# Patient Record
Sex: FEMALE | Race: WHITE | NOT HISPANIC OR LATINO | Employment: FULL TIME | URBAN - METROPOLITAN AREA
[De-identification: names, ages, dates, MRNs, and addresses within clinical notes are randomized per-mention and may not be internally consistent; named-entity substitution may affect disease eponyms.]

---

## 2023-05-23 LAB — CHORIOGONADOTROPIN (MIU/ML) IN SER/PLAS: 8536 IU/L

## 2023-05-25 LAB
CHORIOGONADOTROPIN (MIU/ML) IN SER/PLAS: 1797 IU/L
ERYTHROCYTE DISTRIBUTION WIDTH (RATIO) BY AUTOMATED COUNT: 12.8 % (ref 11.5–14.5)
ERYTHROCYTE MEAN CORPUSCULAR HEMOGLOBIN CONCENTRATION (G/DL) BY AUTOMATED: 32.9 G/DL (ref 32–36)
ERYTHROCYTE MEAN CORPUSCULAR VOLUME (FL) BY AUTOMATED COUNT: 91 FL (ref 80–100)
ERYTHROCYTES (10*6/UL) IN BLOOD BY AUTOMATED COUNT: 3.66 X10E12/L (ref 4–5.2)
HEMATOCRIT (%) IN BLOOD BY AUTOMATED COUNT: 33.4 % (ref 36–46)
HEMOGLOBIN (G/DL) IN BLOOD: 11 G/DL (ref 12–16)
LEUKOCYTES (10*3/UL) IN BLOOD BY AUTOMATED COUNT: 9.4 X10E9/L (ref 4.4–11.3)
NRBC (PER 100 WBCS) BY AUTOMATED COUNT: 0 /100 WBC (ref 0–0)
PLATELETS (10*3/UL) IN BLOOD AUTOMATED COUNT: 292 X10E9/L (ref 150–450)
REFLEX ADDED, ANEMIA PANEL: ABNORMAL

## 2023-10-15 PROBLEM — S99.911A RIGHT ANKLE INJURY, INITIAL ENCOUNTER: Status: ACTIVE | Noted: 2023-10-15

## 2023-10-15 PROBLEM — D22.5 MELANOCYTIC NEVI OF TRUNK: Status: ACTIVE | Noted: 2020-09-03

## 2023-10-15 PROBLEM — K63.89 MASS OF COLON: Status: ACTIVE | Noted: 2023-10-15

## 2023-10-15 PROBLEM — B96.89 BACTERIAL VAGINOSIS: Status: ACTIVE | Noted: 2023-10-15

## 2023-10-15 PROBLEM — D22.60 MELANOCYTIC NEVI OF UNSPECIFIED UPPER LIMB, INCLUDING SHOULDER: Status: ACTIVE | Noted: 2020-09-03

## 2023-10-15 PROBLEM — R07.9 CHEST PAIN: Status: ACTIVE | Noted: 2023-10-15

## 2023-10-15 PROBLEM — O03.4 INCOMPLETE ABORTION (HHS-HCC): Status: ACTIVE | Noted: 2023-10-15

## 2023-10-15 PROBLEM — N89.8 VAGINAL ITCHING: Status: ACTIVE | Noted: 2023-10-15

## 2023-10-15 PROBLEM — R79.89 ABNORMAL THYROID BLOOD TEST: Status: ACTIVE | Noted: 2023-10-15

## 2023-10-15 PROBLEM — D48.5 NEOPLASM OF UNCERTAIN BEHAVIOR OF SKIN: Status: ACTIVE | Noted: 2020-09-03

## 2023-10-15 PROBLEM — N89.8 VAGINAL ODOR: Status: ACTIVE | Noted: 2023-10-15

## 2023-10-15 PROBLEM — B35.1 TINEA UNGUIUM: Status: ACTIVE | Noted: 2020-09-03

## 2023-10-15 PROBLEM — N92.6 IRREGULAR BLEEDING: Status: ACTIVE | Noted: 2023-10-15

## 2023-10-15 PROBLEM — K64.9 HEMORRHOIDS: Status: ACTIVE | Noted: 2023-10-15

## 2023-10-15 PROBLEM — D18.01 HEMANGIOMA OF SKIN AND SUBCUTANEOUS TISSUE: Status: ACTIVE | Noted: 2020-09-03

## 2023-10-15 PROBLEM — L82.1 OTHER SEBORRHEIC KERATOSIS: Status: ACTIVE | Noted: 2020-09-03

## 2023-10-15 PROBLEM — D22.20 MELANOCYTIC NEVI OF UNSPECIFIED EAR AND EXTERNAL AURICULAR CANAL: Status: ACTIVE | Noted: 2020-09-03

## 2023-10-15 PROBLEM — D22.70 MELANOCYTIC NEVI OF UNSPECIFIED LOWER LIMB, INCLUDING HIP: Status: ACTIVE | Noted: 2020-09-03

## 2023-10-15 PROBLEM — N76.0 BACTERIAL VAGINOSIS: Status: ACTIVE | Noted: 2023-10-15

## 2023-10-15 PROBLEM — E04.2 MULTINODULAR NON-TOXIC GOITER: Status: ACTIVE | Noted: 2023-10-15

## 2023-10-15 PROBLEM — R22.31 FINGER MASS, RIGHT: Status: ACTIVE | Noted: 2023-10-15

## 2023-10-15 PROBLEM — O20.0 THREATENED ABORTION (HHS-HCC): Status: ACTIVE | Noted: 2023-10-15

## 2023-10-15 PROBLEM — D22.30 MELANOCYTIC NEVI OF UNSPECIFIED PART OF FACE: Status: ACTIVE | Noted: 2020-09-03

## 2023-10-15 PROBLEM — E05.90 SUBCLINICAL HYPERTHYROIDISM: Status: ACTIVE | Noted: 2023-10-15

## 2023-10-15 PROBLEM — I31.39 PERICARDIAL EFFUSION (HHS-HCC): Status: ACTIVE | Noted: 2023-10-15

## 2023-10-15 PROBLEM — D22.4 MELANOCYTIC NEVI OF SCALP AND NECK: Status: ACTIVE | Noted: 2020-09-03

## 2023-10-15 RX ORDER — HYDROCORTISONE ACETATE PRAMOXINE HCL 2.5; 1 G/100G; G/100G
CREAM TOPICAL 4 TIMES DAILY
COMMUNITY
Start: 2022-08-16 | End: 2023-11-07 | Stop reason: WASHOUT

## 2023-10-15 RX ORDER — MUPIROCIN 20 MG/G
1 OINTMENT TOPICAL
COMMUNITY
Start: 2019-03-15 | End: 2023-11-07 | Stop reason: WASHOUT

## 2023-10-15 RX ORDER — IBUPROFEN 600 MG/1
1 TABLET ORAL EVERY 6 HOURS PRN
COMMUNITY
Start: 2020-11-13 | End: 2024-04-04 | Stop reason: WASHOUT

## 2023-10-15 RX ORDER — CICLOPIROX 80 MG/ML
1 SOLUTION TOPICAL
COMMUNITY
Start: 2019-03-12 | End: 2023-11-07 | Stop reason: WASHOUT

## 2023-10-15 RX ORDER — CICLOPIROX OLAMINE 7.7 MG/G
CREAM TOPICAL
COMMUNITY
Start: 2022-08-27 | End: 2023-11-07 | Stop reason: WASHOUT

## 2023-10-15 RX ORDER — OSELTAMIVIR PHOSPHATE 75 MG/1
1 CAPSULE ORAL 2 TIMES DAILY
COMMUNITY
Start: 2022-03-16 | End: 2023-11-07 | Stop reason: WASHOUT

## 2023-10-15 RX ORDER — DOCUSATE SODIUM 100 MG/1
1 CAPSULE, LIQUID FILLED ORAL 2 TIMES DAILY PRN
COMMUNITY
Start: 2020-11-13 | End: 2023-11-07 | Stop reason: WASHOUT

## 2023-10-15 RX ORDER — ACETAMINOPHEN 325 MG/1
3 TABLET ORAL EVERY 6 HOURS
COMMUNITY
Start: 2020-11-13 | End: 2023-11-07 | Stop reason: WASHOUT

## 2023-10-15 RX ORDER — PRENATAL VIT,CAL 76/IRON/FOLIC 29 MG-1 MG
1 TABLET ORAL DAILY
COMMUNITY
End: 2023-11-07 | Stop reason: WASHOUT

## 2023-10-15 RX ORDER — PRENATAL 71/IRON/FOLIC AC/DHA 30-1.4-2
1 CAPSULE,IMMEDIATE, DELAY RELEASE,BIPHASE ORAL DAILY
COMMUNITY
Start: 2018-05-23 | End: 2023-11-07 | Stop reason: WASHOUT

## 2023-10-17 ENCOUNTER — APPOINTMENT (OUTPATIENT)
Dept: OBSTETRICS AND GYNECOLOGY | Facility: CLINIC | Age: 38
End: 2023-10-17
Payer: COMMERCIAL

## 2023-11-07 ENCOUNTER — TELEMEDICINE (OUTPATIENT)
Dept: PRIMARY CARE | Facility: CLINIC | Age: 38
End: 2023-11-07
Payer: COMMERCIAL

## 2023-11-07 DIAGNOSIS — J01.90 ACUTE NON-RECURRENT SINUSITIS, UNSPECIFIED LOCATION: Primary | ICD-10-CM

## 2023-11-07 PROCEDURE — 99213 OFFICE O/P EST LOW 20 MIN: CPT | Performed by: FAMILY MEDICINE

## 2023-11-07 RX ORDER — AMOXICILLIN AND CLAVULANATE POTASSIUM 875; 125 MG/1; MG/1
875 TABLET, FILM COATED ORAL 2 TIMES DAILY
Qty: 14 TABLET | Refills: 0 | Status: SHIPPED | OUTPATIENT
Start: 2023-11-07 | End: 2023-11-14

## 2023-11-07 ASSESSMENT — LIFESTYLE VARIABLES: HISTORY_OF_SMOKING: I SMOKED IN THE PAST, BUT NOT REGULARLY

## 2023-11-07 ASSESSMENT — ENCOUNTER SYMPTOMS
RHINORRHEA: 1
SINUS PRESSURE: 1
FEVER: 0
SINUS PAIN: 1
COUGH: 0
SHORTNESS OF BREATH: 0
FATIGUE: 0

## 2023-11-07 NOTE — PROGRESS NOTES
Subjective   Patient ID: Amparo Perez is a 38 y.o. female who presents for No chief complaint on file..    Virtual or Telephone Consent    An interactive audio and video telecommunication system which permits real time communications between the patient (at the originating site) and provider (at the distant site) was utilized to provide this telehealth service.   Verbal consent was requested and obtained from Amparo Perez on this date, 11/07/23 for a telehealth visit.   Pt presents with sore throat, nasal congestion and now laryngitis  No fever or chills    She has taken Mucinex, Flonase, Ibuprofen               Review of Systems   Constitutional:  Negative for fatigue and fever.   HENT:  Positive for rhinorrhea, sinus pressure and sinus pain.    Respiratory:  Negative for cough and shortness of breath.        Objective   There were no vitals taken for this visit.    Physical Exam  Constitutional:       Appearance: Normal appearance.      Comments: Voice hoarseness noted   HENT:      Nose: Congestion present.   Pulmonary:      Effort: Pulmonary effort is normal.   Neurological:      Mental Status: She is alert.         Virtual Visit Duration: 10 min  Pt verified with Photo ID in Media  Pt is not currently pregnant    Assessment/Plan   Diagnoses and all orders for this visit:  Acute non-recurrent sinusitis, unspecified location  -     amoxicillin-pot clavulanate (Augmentin) 875-125 mg tablet; Take 1 tablet (875 mg) by mouth 2 times a day for 7 days.  I counseled patient regarding the risks, benefits and side effects of antibiotics.     Recc continued supportive care, fluids, rest, Mucinex w/ decongestant, cool mist humidifier  I advised the patient to have an in person exam with PCP, urgent care, or Emergency Room with any exacerbation of symptoms. The patient understands and agrees.     Karolina Padron, DO

## 2023-11-27 ENCOUNTER — OFFICE VISIT (OUTPATIENT)
Dept: OBSTETRICS AND GYNECOLOGY | Facility: CLINIC | Age: 38
End: 2023-11-27
Payer: COMMERCIAL

## 2023-11-27 VITALS
HEIGHT: 68 IN | BODY MASS INDEX: 28.04 KG/M2 | DIASTOLIC BLOOD PRESSURE: 69 MMHG | SYSTOLIC BLOOD PRESSURE: 121 MMHG | WEIGHT: 185 LBS

## 2023-11-27 DIAGNOSIS — Z01.419 ENCOUNTER FOR GYNECOLOGICAL EXAMINATION WITHOUT ABNORMAL FINDING: ICD-10-CM

## 2023-11-27 DIAGNOSIS — Z30.41 ENCOUNTER FOR SURVEILLANCE OF CONTRACEPTIVE PILLS: Primary | ICD-10-CM

## 2023-11-27 PROCEDURE — 99395 PREV VISIT EST AGE 18-39: CPT | Performed by: OBSTETRICS & GYNECOLOGY

## 2023-11-27 PROCEDURE — 1036F TOBACCO NON-USER: CPT | Performed by: OBSTETRICS & GYNECOLOGY

## 2023-11-27 RX ORDER — LEVONORGESTREL AND ETHINYL ESTRADIOL 0.15-0.03
1 KIT ORAL DAILY
Qty: 90 TABLET | Refills: 3 | Status: SHIPPED | OUTPATIENT
Start: 2023-11-27 | End: 2024-03-19 | Stop reason: ALTCHOICE

## 2023-11-27 NOTE — PROGRESS NOTES
Subjective   Amparo Perez is a 38 y.o. female  here for a routine exam. Current complaints: She has regular cycles but they are getting heavier.  She is considering resuming birth control pills.  She has found the pill that she was on most recently, Levora, was the most tolerable.    Her daughter is now 3 years old.  There is no dysuria, no change in bowel habits or vaginal discharge.  She is a small bump on the left vulva.. Personal health questionnaire reviewed: yes.     Gynecologic History  Patient's last menstrual period was 2023 (exact date).  Contraception: none  Last Pap: 21. Results were: normal  Last mammogram: n/a. Results were:  n/a    Obstetric History  OB History   No obstetric history on file.       Objective   Constitutional: Alert and in no acute distress. Well developed, well nourished.   Head and Face: Head and face: Normal.    Eyes: Normal external exam - nonicteric sclera, extraocular movements intact (EOMI) and no ptosis.   Neck: No neck asymmetry. Supple. Thyroid not enlarged and there were no palpable thyroid nodules.    Pulmonary: No respiratory distress.   Chest: Breasts: Normal appearance, no nipple discharge and no skin changes. Palpation of breasts and axillae: No palpable mass and no axillary lymphadenopathy.   Abdomen: Soft nontender; no abdominal mass palpated. No organomegaly. No hernias.   Genitourinary: External genitalia: Normal. No inguinal lymphadenopathy. Bartholin's Urethral and Skenes Glands: Normal. Urethra: Normal.  Bladder: Normal on palpation. Vagina: Normal. Cervix: Normal.  Uterus: Normal.  Right Adnexa/parametria: Normal.  Left Adnexa/parametria: Normal.  Inspection of Perianal Area: Normal.   Musculoskeletal: No joint swelling seen, normal movements of all extremities.   Skin: Normal skin color and pigmentation, normal skin turgor, and no rash.   Neurologic: Non-focal. Grossly intact.   Psychiatric: Alert and oriented x 3. Affect normal to  patient baseline. Mood: Appropriate.  Physical Exam     Assessment/Plan   Healthy female exam.  This is a 38-year-old female with a normal exam.  No Pap was sent, she is high risk HPV negative.  We discussed resuming her birth control pill most recent was Levora.  I recommend starting on the Sunday after her period starts.  It may take 2 weeks for it to be effective for contraception in about 3 months to adjust to the pill.  I will see her routinely in 1 year.  Contraception: OCP (estrogen/progesterone).

## 2023-12-19 ENCOUNTER — APPOINTMENT (OUTPATIENT)
Dept: OBSTETRICS AND GYNECOLOGY | Facility: CLINIC | Age: 38
End: 2023-12-19
Payer: COMMERCIAL

## 2023-12-19 ENCOUNTER — TELEMEDICINE (OUTPATIENT)
Dept: PRIMARY CARE | Facility: CLINIC | Age: 38
End: 2023-12-19
Payer: COMMERCIAL

## 2023-12-19 DIAGNOSIS — J01.01 ACUTE RECURRENT MAXILLARY SINUSITIS: Primary | ICD-10-CM

## 2023-12-19 PROCEDURE — 99213 OFFICE O/P EST LOW 20 MIN: CPT | Performed by: FAMILY MEDICINE

## 2023-12-19 RX ORDER — AMOXICILLIN AND CLAVULANATE POTASSIUM 875; 125 MG/1; MG/1
875 TABLET, FILM COATED ORAL 2 TIMES DAILY
Qty: 20 TABLET | Refills: 0 | Status: SHIPPED | OUTPATIENT
Start: 2023-12-19 | End: 2023-12-29

## 2023-12-19 ASSESSMENT — ENCOUNTER SYMPTOMS
SINUS PRESSURE: 1
SINUS PAIN: 1
SHORTNESS OF BREATH: 0
STRIDOR: 0
WHEEZING: 0
COUGH: 1
FATIGUE: 0
FEVER: 0

## 2023-12-19 NOTE — PROGRESS NOTES
Subjective   Patient ID: Amparo Perez is a 38 y.o. female who presents for No chief complaint on file..    Virtual or Telephone Consent    An interactive audio and video telecommunication system which permits real time communications between the patient (at the originating site) and provider (at the distant site) was utilized to provide this telehealth service.   Verbal consent was requested and obtained from Amparo Perez on this date, 12/19/23 for a telehealth visit.       Pt presents with cough, cold, congestion x 10 days  No fever, no chills  OTC meds: Cold and sinus med, as well as Flonase  She has maxillary sinus pressure as well  No SOB    She reports she did have a sinus infection about 2 months ago and Augmentin did work for her         Review of Systems   Constitutional:  Negative for fatigue and fever.   HENT:  Positive for congestion, sinus pressure and sinus pain.    Respiratory:  Positive for cough. Negative for shortness of breath, wheezing and stridor.        Objective   LMP 11/19/2023 (Exact Date)     Physical Exam  Constitutional:       Appearance: Normal appearance.   HENT:      Nose: Congestion present.   Pulmonary:      Effort: Pulmonary effort is normal.   Neurological:      Mental Status: She is alert.       Virtual Visit Duration: 6 min  Meds reviewed  Med allergies: NKDA    Assessment/Plan   Diagnoses and all orders for this visit:  Acute recurrent maxillary sinusitis  -     amoxicillin-pot clavulanate (Augmentin) 875-125 mg tablet; Take 1 tablet (875 mg) by mouth 2 times a day for 10 days.    I counseled patient regarding the risks, benefits and side effects of antibiotics.  Recc continued supportive care, fluids, rest OTC cough and cold meds    I advised the patient to have an in person exam with PCP, urgent care, or Emergency Room with any exacerbation of symptoms. The patient understands and agrees.   Karolina Padron, DO

## 2024-01-03 ENCOUNTER — LAB (OUTPATIENT)
Dept: LAB | Facility: LAB | Age: 39
End: 2024-01-03
Payer: COMMERCIAL

## 2024-01-03 ENCOUNTER — TELEPHONE (OUTPATIENT)
Dept: ENDOCRINOLOGY | Facility: CLINIC | Age: 39
End: 2024-01-03
Payer: COMMERCIAL

## 2024-01-03 DIAGNOSIS — E05.90 SUBCLINICAL HYPERTHYROIDISM: Primary | ICD-10-CM

## 2024-01-03 DIAGNOSIS — E05.90 SUBCLINICAL HYPERTHYROIDISM: ICD-10-CM

## 2024-01-03 DIAGNOSIS — E04.2 MULTINODULAR GOITER: ICD-10-CM

## 2024-01-03 LAB
T4 FREE SERPL-MCNC: 0.83 NG/DL (ref 0.61–1.12)
TSH SERPL-ACNC: 0.49 MIU/L (ref 0.44–3.98)

## 2024-01-03 PROCEDURE — 84443 ASSAY THYROID STIM HORMONE: CPT

## 2024-01-03 PROCEDURE — 84439 ASSAY OF FREE THYROXINE: CPT

## 2024-01-03 PROCEDURE — 36415 COLL VENOUS BLD VENIPUNCTURE: CPT

## 2024-01-03 PROCEDURE — 84481 FREE ASSAY (FT-3): CPT

## 2024-01-04 LAB — T3FREE SERPL-MCNC: 3.1 PG/ML (ref 2.3–4.2)

## 2024-01-08 ENCOUNTER — HOSPITAL ENCOUNTER (OUTPATIENT)
Dept: RADIOLOGY | Facility: HOSPITAL | Age: 39
Discharge: HOME | End: 2024-01-08
Payer: COMMERCIAL

## 2024-01-08 DIAGNOSIS — E04.2 MULTINODULAR GOITER: ICD-10-CM

## 2024-01-08 PROCEDURE — 76536 US EXAM OF HEAD AND NECK: CPT

## 2024-01-08 PROCEDURE — 76536 US EXAM OF HEAD AND NECK: CPT | Performed by: RADIOLOGY

## 2024-03-19 ENCOUNTER — OFFICE VISIT (OUTPATIENT)
Dept: ENDOCRINOLOGY | Facility: CLINIC | Age: 39
End: 2024-03-19
Payer: COMMERCIAL

## 2024-03-19 VITALS
DIASTOLIC BLOOD PRESSURE: 76 MMHG | HEART RATE: 66 BPM | RESPIRATION RATE: 16 BRPM | SYSTOLIC BLOOD PRESSURE: 130 MMHG | HEIGHT: 68 IN | BODY MASS INDEX: 28.61 KG/M2 | WEIGHT: 188.8 LBS

## 2024-03-19 DIAGNOSIS — E04.2 MULTINODULAR GOITER: Primary | ICD-10-CM

## 2024-03-19 PROCEDURE — 1036F TOBACCO NON-USER: CPT | Performed by: INTERNAL MEDICINE

## 2024-03-19 PROCEDURE — 99204 OFFICE O/P NEW MOD 45 MIN: CPT | Performed by: INTERNAL MEDICINE

## 2024-03-19 RX ORDER — SULFACETAMIDE SODIUM, SULFUR 100; 50 MG/G; MG/G
EMULSION TOPICAL
COMMUNITY
Start: 2024-01-29 | End: 2024-05-13 | Stop reason: ALTCHOICE

## 2024-03-19 RX ORDER — TAZAROTENE 0.45 MG/G
LOTION TOPICAL DAILY
COMMUNITY
End: 2024-04-04 | Stop reason: WASHOUT

## 2024-03-19 ASSESSMENT — ENCOUNTER SYMPTOMS
COUGH: 0
FATIGUE: 0
DIARRHEA: 0
FEVER: 0
HEADACHES: 0
VOMITING: 0
PALPITATIONS: 0
NAUSEA: 0
SHORTNESS OF BREATH: 0
CHILLS: 0

## 2024-03-19 NOTE — PROGRESS NOTES
Endocrinology New Patient Consult  Subjective   Patient ID: Amparo Perez is a 39 y.o. female who presents for Goiter.    PCP: Armando Trejo MD    HPI  39-year-old self-referred for evaluation of goiter.  Of note I have seen her in the past but not since 2021.  At that time she had had subclinical hyperthyroidism during pregnancy which resolved as well as multinodular goiter.  She is here today as her goiter bothers her and she feels very self-conscious about it.  She is inquiring as to medication that could shrink it.  She did have 2 miscarriages since I last saw her otherwise she has been doing well.  She has no systemic complaints.  She has no problems swallowing but does note that her neck is uncomfortable when being touched by clothing or even her toddler.      Review of Systems   Constitutional:  Negative for chills, fatigue and fever.   Respiratory:  Negative for cough and shortness of breath.    Cardiovascular:  Negative for chest pain and palpitations.   Gastrointestinal:  Negative for diarrhea, nausea and vomiting.   Neurological:  Negative for headaches.       Patient Active Problem List   Diagnosis    Vaginal odor    Vaginal itching    Tinea unguium    Subclinical hyperthyroidism    Right ankle injury, initial encounter    Pericardial effusion    Other seborrheic keratosis    Neoplasm of uncertain behavior of skin    Multinodular non-toxic goiter    Melanocytic nevi of unspecified ear and external auricular canal    Melanocytic nevi of unspecified upper limb, including shoulder    Melanocytic nevi of unspecified part of face    Melanocytic nevi of unspecified lower limb, including hip    Melanocytic nevi of trunk    Melanocytic nevi of scalp and neck    Mass of colon    Irregular bleeding    Hemorrhoids    Hemangioma of skin and subcutaneous tissue    Finger mass, right    Chest pain    Bacterial vaginosis    Abnormal thyroid blood test    Incomplete     Threatened         History reviewed. No pertinent past medical history.     Past Surgical History:   Procedure Laterality Date    ANKLE SURGERY  06/02/2016    Ankle Surgery    CERVICAL BIOPSY  W/ LOOP ELECTRODE EXCISION  03/27/2014    Cervical Loop Electrosurgical Excision (LEEP)    COLONOSCOPY  06/02/2016    Complete Colonoscopy    OTHER SURGICAL HISTORY  06/02/2016    Wrist Excision Of Ganglion    US GUIDED THYROID BIOPSY  10/10/2012    US GUIDED THYROID BIOPSY 10/10/2012 [facility]        Social History     Tobacco Use   Smoking Status Never   Smokeless Tobacco Never      Social History     Substance and Sexual Activity   Alcohol Use None      Marital status:  Employment:rn    Family History   Problem Relation Name Age of Onset    Other (htn) Father          Home Meds:  Current Outpatient Medications   Medication Instructions    ibuprofen 600 mg tablet 1 tablet, oral, Every 6 hours PRN    levonorgestreL-ethinyl estrad (Nordette) 0.15-0.03 mg tablet 1 tablet, oral, Daily    sulfacetamide sodium-sulfur (Avar, Plexion) 10-5 % (w/w) topical emulsion Wash face twice daily    tazarotene (Arazlo) 0.045 % lotion topical (top), Daily        No Known Allergies     Objective   Vitals:    03/19/24 0924   BP: 130/76   Pulse: 66   Resp: 16      Vitals:    03/19/24 0924   Weight: 85.6 kg (188 lb 12.8 oz)      Body mass index is 28.71 kg/m².   Physical Exam  Constitutional:       Appearance: Normal appearance. She is overweight.   HENT:      Head: Normocephalic and atraumatic.   Neck:      Thyroid: Thyromegaly present. No thyroid mass or thyroid tenderness.      Comments: Mildly to moderately enlarged thyroid gland with no palpable nodules with prominent isthmus  Cardiovascular:      Rate and Rhythm: Normal rate and regular rhythm.      Heart sounds: No murmur heard.     No gallop.   Pulmonary:      Effort: Pulmonary effort is normal.      Breath sounds: Normal breath sounds.   Abdominal:      Palpations: Abdomen is soft.       Comments: benign   Neurological:      General: No focal deficit present.      Mental Status: She is alert and oriented to person, place, and time.      Deep Tendon Reflexes: Reflexes are normal and symmetric.   Psychiatric:         Behavior: Behavior is cooperative.         Labs:  Lab Results   Component Value Date    TSH 0.49 01/03/2024    FREET4 0.83 01/03/2024      Lab Results   Component Value Date    THYROIDPAB 40 12/29/2020    TSI <1.0 12/29/2020        Assessment/Plan   Problem List Items Addressed This Visit    None  Visit Diagnoses       Multinodular goiter    -  Primary    Relevant Orders    Referral to Surgical Oncology        39-year-old here for evaluation of multinodular goiter.  We discussed her course.  We reviewed her blood work and ultrasound that she had in January.  Her thyroid blood work is completely normal at this time.  We discussed that thyroid replacement medication would not shrink her nodules or gland at her current levels.  We did review her thyroid ultrasound which was very stable and showed no change in her nodules from previous ultrasound from 2020.  I do think that her thyroid gland is more prominent due to the length of her neck and body habitus We did discuss her goiter and projected course over time which is somewhat unpredictable but reassuring as her ultrasound has not changed over approximately 4 years..  We discussed thyroidectomy and risks.  She would like to meet with the surgeon at this time to go over it.  I encouraged her to call or message with concerns or questions.    Electronically signed by:  Marcela Nuñez MD 03/19/24  12:49 PM

## 2024-03-19 NOTE — PATIENT INSTRUCTIONS
Referral placed for you to see Dr. Cruz  Follow-up in 1 year  Please call or message with concerns or questions

## 2024-04-01 ENCOUNTER — LAB (OUTPATIENT)
Dept: LAB | Facility: LAB | Age: 39
End: 2024-04-01
Payer: COMMERCIAL

## 2024-04-01 ENCOUNTER — TELEPHONE (OUTPATIENT)
Dept: OBSTETRICS AND GYNECOLOGY | Facility: CLINIC | Age: 39
End: 2024-04-01
Payer: COMMERCIAL

## 2024-04-01 DIAGNOSIS — N92.0 MENORRHAGIA WITH REGULAR CYCLE: ICD-10-CM

## 2024-04-01 DIAGNOSIS — N89.8 VAGINAL ODOR: Primary | ICD-10-CM

## 2024-04-01 LAB
ERYTHROCYTE [DISTWIDTH] IN BLOOD BY AUTOMATED COUNT: 13 % (ref 11.5–14.5)
HCT VFR BLD AUTO: 43.1 % (ref 36–46)
HGB BLD-MCNC: 14.3 G/DL (ref 12–16)
MCH RBC QN AUTO: 30 PG (ref 26–34)
MCHC RBC AUTO-ENTMCNC: 33.2 G/DL (ref 32–36)
MCV RBC AUTO: 91 FL (ref 80–100)
NRBC BLD-RTO: 0 /100 WBCS (ref 0–0)
PLATELET # BLD AUTO: 255 X10*3/UL (ref 150–450)
RBC # BLD AUTO: 4.76 X10*6/UL (ref 4–5.2)
REFLEX ADDED, ANEMIA PANEL: NORMAL
WBC # BLD AUTO: 6.2 X10*3/UL (ref 4.4–11.3)

## 2024-04-01 PROCEDURE — 85027 COMPLETE CBC AUTOMATED: CPT

## 2024-04-01 PROCEDURE — 36415 COLL VENOUS BLD VENIPUNCTURE: CPT

## 2024-04-01 RX ORDER — METRONIDAZOLE 500 MG/1
500 TABLET ORAL 2 TIMES DAILY
Qty: 14 TABLET | Refills: 1 | Status: SHIPPED | OUTPATIENT
Start: 2024-04-01 | End: 2024-04-08

## 2024-04-01 NOTE — TELEPHONE ENCOUNTER
Pt called and stated she is having back to back heavy periods her last two were 18 days and one was 22 days  and she would like to know if she would like blood work done for anemia. Also if you could  send in medication for bv. Please advise thank you

## 2024-04-01 NOTE — TELEPHONE ENCOUNTER
The patient called, her last 2 menses were heavy and closer.  She is requesting a blood count.  She also has BV symptoms they are requesting a treatment.  Metronidazole tablet was ordered.  A CBC was ordered.  She has a recent normal TSH.

## 2024-04-04 ENCOUNTER — LAB (OUTPATIENT)
Dept: LAB | Facility: LAB | Age: 39
End: 2024-04-04
Payer: COMMERCIAL

## 2024-04-04 ENCOUNTER — OFFICE VISIT (OUTPATIENT)
Dept: PRIMARY CARE | Facility: CLINIC | Age: 39
End: 2024-04-04
Payer: COMMERCIAL

## 2024-04-04 VITALS
HEIGHT: 68 IN | TEMPERATURE: 97.8 F | SYSTOLIC BLOOD PRESSURE: 118 MMHG | BODY MASS INDEX: 28.19 KG/M2 | WEIGHT: 186 LBS | DIASTOLIC BLOOD PRESSURE: 80 MMHG

## 2024-04-04 DIAGNOSIS — Z00.00 HEALTHCARE MAINTENANCE: Primary | ICD-10-CM

## 2024-04-04 DIAGNOSIS — Z87.891 FORMER SMOKER: ICD-10-CM

## 2024-04-04 DIAGNOSIS — E04.9 ENLARGED THYROID: ICD-10-CM

## 2024-04-04 DIAGNOSIS — E55.9 VITAMIN D DEFICIENCY: ICD-10-CM

## 2024-04-04 DIAGNOSIS — Z00.00 HEALTHCARE MAINTENANCE: ICD-10-CM

## 2024-04-04 DIAGNOSIS — R30.0 DYSURIA: ICD-10-CM

## 2024-04-04 DIAGNOSIS — Z82.49 FAMILY HISTORY OF CORONARY ARTERY DISEASE IN FATHER: ICD-10-CM

## 2024-04-04 PROBLEM — S92.009A CLOSED FRACTURE OF CALCANEUS: Status: ACTIVE | Noted: 2018-10-10

## 2024-04-04 PROBLEM — O03.9 ABORTION (HHS-HCC): Status: ACTIVE | Noted: 2023-10-15

## 2024-04-04 PROBLEM — R10.9 ABDOMINAL PAIN: Status: ACTIVE | Noted: 2023-01-06

## 2024-04-04 PROBLEM — D22.60 MELANOCYTIC NEVUS OF UPPER EXTREMITY: Status: ACTIVE | Noted: 2020-09-03

## 2024-04-04 PROBLEM — N92.6 IRREGULAR MENSTRUAL CYCLE: Status: ACTIVE | Noted: 2023-10-15

## 2024-04-04 PROBLEM — J01.90 ACUTE SINUSITIS: Status: ACTIVE | Noted: 2024-04-04

## 2024-04-04 PROBLEM — R22.30 LUMP ON FINGER: Status: ACTIVE | Noted: 2023-10-15

## 2024-04-04 PROBLEM — E04.2 NON-TOXIC MULTINODULAR GOITER: Status: ACTIVE | Noted: 2023-10-15

## 2024-04-04 PROBLEM — N89.8 PRURITUS OF VAGINA: Status: ACTIVE | Noted: 2023-10-15

## 2024-04-04 PROBLEM — R94.6 ABNORMAL FINDING ON THYROID FUNCTION TEST: Status: ACTIVE | Noted: 2023-10-15

## 2024-04-04 PROBLEM — S99.911A INJURY OF RIGHT ANKLE: Status: ACTIVE | Noted: 2023-10-15

## 2024-04-04 PROBLEM — O09.529 MULTIGRAVIDA OF ADVANCED MATERNAL AGE (HHS-HCC): Status: ACTIVE | Noted: 2024-04-04

## 2024-04-04 PROBLEM — B35.1 ONYCHOMYCOSIS DUE TO DERMATOPHYTE: Status: ACTIVE | Noted: 2020-09-03

## 2024-04-04 PROBLEM — O46.90 ANTEPARTUM HEMORRHAGE (HHS-HCC): Status: ACTIVE | Noted: 2023-05-23

## 2024-04-04 PROBLEM — D22.30 MELANOCYTIC NEVUS OF FACE: Status: ACTIVE | Noted: 2020-09-03

## 2024-04-04 PROBLEM — D22.5 MELANOCYTIC NEVUS OF TRUNK: Status: ACTIVE | Noted: 2020-09-03

## 2024-04-04 PROBLEM — D22.70 MELANOCYTIC NEVUS OF LOWER EXTREMITY: Status: ACTIVE | Noted: 2020-09-03

## 2024-04-04 PROBLEM — J32.9 SINUSITIS: Status: ACTIVE | Noted: 2024-04-04

## 2024-04-04 PROBLEM — D22.20: Status: ACTIVE | Noted: 2020-09-03

## 2024-04-04 PROBLEM — R19.7 DIARRHEA: Status: ACTIVE | Noted: 2024-04-04

## 2024-04-04 PROBLEM — O02.1 MISSED ABORTION (HHS-HCC): Status: ACTIVE | Noted: 2024-04-04

## 2024-04-04 PROBLEM — L82.1 SEBORRHEIC KERATOSIS: Status: ACTIVE | Noted: 2020-09-03

## 2024-04-04 PROBLEM — D22.9 MELANOCYTIC NEVUS OF SKIN: Status: ACTIVE | Noted: 2020-09-03

## 2024-04-04 LAB
25(OH)D3 SERPL-MCNC: 23 NG/ML (ref 30–100)
ALBUMIN SERPL BCP-MCNC: 4.6 G/DL (ref 3.4–5)
ALP SERPL-CCNC: 51 U/L (ref 33–110)
ALT SERPL W P-5'-P-CCNC: 11 U/L (ref 7–45)
ANION GAP SERPL CALC-SCNC: 10 MMOL/L (ref 10–20)
AST SERPL W P-5'-P-CCNC: 14 U/L (ref 9–39)
BILIRUB SERPL-MCNC: 0.9 MG/DL (ref 0–1.2)
BUN SERPL-MCNC: 12 MG/DL (ref 6–23)
CALCIUM SERPL-MCNC: 9.5 MG/DL (ref 8.6–10.3)
CHLORIDE SERPL-SCNC: 102 MMOL/L (ref 98–107)
CHOLEST SERPL-MCNC: 243 MG/DL (ref 0–199)
CHOLESTEROL/HDL RATIO: 3.2
CO2 SERPL-SCNC: 29 MMOL/L (ref 21–32)
CREAT SERPL-MCNC: 0.67 MG/DL (ref 0.5–1.05)
EGFRCR SERPLBLD CKD-EPI 2021: >90 ML/MIN/1.73M*2
GLUCOSE SERPL-MCNC: 86 MG/DL (ref 74–99)
HDLC SERPL-MCNC: 76.4 MG/DL
LDLC SERPL CALC-MCNC: 157 MG/DL
NON HDL CHOLESTEROL: 167 MG/DL (ref 0–149)
POC APPEARANCE, URINE: CLEAR
POC BILIRUBIN, URINE: NEGATIVE
POC BLOOD, URINE: NEGATIVE
POC COLOR, URINE: YELLOW
POC GLUCOSE, URINE: NEGATIVE MG/DL
POC KETONES, URINE: NEGATIVE MG/DL
POC LEUKOCYTES, URINE: NEGATIVE
POC NITRITE,URINE: NEGATIVE
POC PH, URINE: 6 PH
POC PROTEIN, URINE: NEGATIVE MG/DL
POC SPECIFIC GRAVITY, URINE: 1.01
POC UROBILINOGEN, URINE: 0.2 EU/DL
POTASSIUM SERPL-SCNC: 4.3 MMOL/L (ref 3.5–5.3)
PROT SERPL-MCNC: 7.1 G/DL (ref 6.4–8.2)
SODIUM SERPL-SCNC: 137 MMOL/L (ref 136–145)
T3 SERPL-MCNC: 96 NG/DL (ref 60–200)
T4 FREE SERPL-MCNC: 0.91 NG/DL (ref 0.61–1.12)
TRIGL SERPL-MCNC: 46 MG/DL (ref 0–149)
TSH SERPL-ACNC: 0.21 MIU/L (ref 0.44–3.98)
VLDL: 9 MG/DL (ref 0–40)

## 2024-04-04 PROCEDURE — 84480 ASSAY TRIIODOTHYRONINE (T3): CPT

## 2024-04-04 PROCEDURE — 81003 URINALYSIS AUTO W/O SCOPE: CPT | Performed by: NURSE PRACTITIONER

## 2024-04-04 PROCEDURE — 36415 COLL VENOUS BLD VENIPUNCTURE: CPT

## 2024-04-04 PROCEDURE — 93000 ELECTROCARDIOGRAM COMPLETE: CPT | Performed by: NURSE PRACTITIONER

## 2024-04-04 PROCEDURE — 99395 PREV VISIT EST AGE 18-39: CPT | Performed by: NURSE PRACTITIONER

## 2024-04-04 PROCEDURE — 80061 LIPID PANEL: CPT

## 2024-04-04 PROCEDURE — 84439 ASSAY OF FREE THYROXINE: CPT

## 2024-04-04 PROCEDURE — 80053 COMPREHEN METABOLIC PANEL: CPT

## 2024-04-04 PROCEDURE — 82306 VITAMIN D 25 HYDROXY: CPT

## 2024-04-04 PROCEDURE — 84443 ASSAY THYROID STIM HORMONE: CPT

## 2024-04-04 RX ORDER — LEVONORGESTREL AND ETHINYL ESTRADIOL 0.15-0.03
1 KIT ORAL
COMMUNITY
Start: 2023-11-27 | End: 2024-05-13 | Stop reason: ALTCHOICE

## 2024-04-04 ASSESSMENT — PATIENT HEALTH QUESTIONNAIRE - PHQ9
2. FEELING DOWN, DEPRESSED OR HOPELESS: NOT AT ALL
1. LITTLE INTEREST OR PLEASURE IN DOING THINGS: NOT AT ALL
SUM OF ALL RESPONSES TO PHQ9 QUESTIONS 1 AND 2: 0

## 2024-04-04 NOTE — PROGRESS NOTES
"Subjective   Patient ID: Amparo Perez is a 39 y.o. female who presents for Annual Exam.    HPI   GYN - [facility] 1 daughter 3.5   LEEP ~ 2012 all normal since\  2 miscarriages in last year.  Last 2 periods were 18 days, and 22 days in between. 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    Review of Systems    Objective   /80   Temp 36.6 °C (97.8 °F)   Ht 1.721 m (5' 7.75\")   Wt 84.4 kg (186 lb)   BMI 28.49 kg/m²     Physical Exam    Assessment/Plan   {Assess/PlanSmartLinks:20948}       "

## 2024-04-04 NOTE — PATIENT INSTRUCTIONS
Nice to meet you.  I look forward to working with you to meet your healthcare needs.  Fasting labs today and I will follow up.  Call Dr. Echols's office to check on Colonoscopy recommendation.  5/9/16 report indicated 3 year follow up.  Let me know if you do need a referral.

## 2024-04-04 NOTE — PROGRESS NOTES
"Subjective   Patient ID: Amparo Perez is a 39 y.o. female who presents for Annual Exam.    HPI   38 yo CF here to establish care  Has been in the  system.  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      Objective   /80   Temp 36.6 °C (97.8 °F)   Ht 1.721 m (5' 7.75\")   Wt 84.4 kg (186 lb)   LMP 03/25/2024   BMI 28.49 kg/m²     Physical Exam  Vitals and nursing note reviewed.   Constitutional:       Appearance: Normal appearance.   HENT:      Head: Normocephalic and atraumatic.      Right Ear: Tympanic membrane, ear canal and external ear normal.      Left Ear: Tympanic membrane, ear canal and external ear normal.      Nose: Nose normal.      Mouth/Throat:      Pharynx: Oropharynx is clear.   Eyes:      Pupils: Pupils are equal, round, and reactive to light.   Neck:      Thyroid: Thyromegaly present. No thyroid mass.   Cardiovascular:      Rate and Rhythm: Normal rate and regular rhythm.      Pulses: Normal pulses.      Heart sounds: Normal heart sounds.      No friction rub.   Pulmonary:      Effort: Pulmonary effort is normal.      Breath sounds: Normal breath sounds.   Abdominal:      General: Bowel sounds are normal.      Palpations: Abdomen is soft.   Genitourinary:     Comments: Defer  Musculoskeletal:         General: Normal range of motion.      Cervical back: Normal range of motion and neck supple.   Skin:     General: Skin is warm.   Neurological:      Mental Status: She is alert and oriented to person, place, and time.   Psychiatric:         Mood and Affect: Mood normal.         Assessment/Plan   Problem List Items Addressed This Visit             ICD-10-CM    Enlarged thyroid E04.9    Relevant Orders    Triiodothyronine, Total (Completed) "    T4, free (Completed)    Family history of coronary artery disease in father Z82.49    Relevant Orders    ECG 12 lead (Clinic Performed) (Completed)    Former smoker Z87.891    Relevant Orders    POCT UA Automated manually resulted (Completed)    Healthcare maintenance - Primary Z00.00    Relevant Orders    Comprehensive Metabolic Panel (Completed)    Thyroid Stimulating Hormone (Completed)    Lipid Panel (Completed)    Vitamin D deficiency E55.9    Relevant Orders    Vitamin D 25-Hydroxy,Total (for eval of Vitamin D levels) (Completed)     Other Visit Diagnoses         Codes    Dysuria     R30.0

## 2024-04-05 DIAGNOSIS — E05.90 SUBCLINICAL HYPERTHYROIDISM: Primary | ICD-10-CM

## 2024-04-08 PROBLEM — Z87.891 FORMER SMOKER: Status: ACTIVE | Noted: 2024-04-08

## 2024-04-08 PROBLEM — Z82.49 FAMILY HISTORY OF CORONARY ARTERY DISEASE IN FATHER: Status: ACTIVE | Noted: 2024-04-08

## 2024-04-08 PROBLEM — Z00.00 HEALTHCARE MAINTENANCE: Status: ACTIVE | Noted: 2024-04-08

## 2024-04-08 PROBLEM — E55.9 VITAMIN D DEFICIENCY: Status: ACTIVE | Noted: 2024-04-08

## 2024-04-08 PROBLEM — E04.9 ENLARGED THYROID: Status: ACTIVE | Noted: 2024-04-08

## 2024-05-01 ENCOUNTER — LAB (OUTPATIENT)
Dept: LAB | Facility: LAB | Age: 39
End: 2024-05-01
Payer: COMMERCIAL

## 2024-05-01 DIAGNOSIS — E05.90 SUBCLINICAL HYPERTHYROIDISM: ICD-10-CM

## 2024-05-01 LAB
T3FREE SERPL-MCNC: 3.2 PG/ML (ref 2.3–4.2)
T4 FREE SERPL-MCNC: 1.02 NG/DL (ref 0.61–1.12)
TSH SERPL-ACNC: 0.25 MIU/L (ref 0.44–3.98)

## 2024-05-01 PROCEDURE — 84443 ASSAY THYROID STIM HORMONE: CPT

## 2024-05-01 PROCEDURE — 84481 FREE ASSAY (FT-3): CPT

## 2024-05-01 PROCEDURE — 36415 COLL VENOUS BLD VENIPUNCTURE: CPT

## 2024-05-01 PROCEDURE — 84439 ASSAY OF FREE THYROXINE: CPT

## 2024-05-13 ENCOUNTER — OFFICE VISIT (OUTPATIENT)
Dept: SURGERY | Facility: CLINIC | Age: 39
End: 2024-05-13
Payer: COMMERCIAL

## 2024-05-13 VITALS
SYSTOLIC BLOOD PRESSURE: 135 MMHG | WEIGHT: 182 LBS | BODY MASS INDEX: 27.88 KG/M2 | DIASTOLIC BLOOD PRESSURE: 81 MMHG | HEART RATE: 86 BPM

## 2024-05-13 DIAGNOSIS — E04.2 MULTINODULAR GOITER: ICD-10-CM

## 2024-05-13 PROCEDURE — 1036F TOBACCO NON-USER: CPT | Performed by: SURGERY

## 2024-05-13 PROCEDURE — 99205 OFFICE O/P NEW HI 60 MIN: CPT | Performed by: SURGERY

## 2024-05-13 ASSESSMENT — ENCOUNTER SYMPTOMS
NEUROLOGICAL NEGATIVE: 1
ENDOCRINE NEGATIVE: 1
RESPIRATORY NEGATIVE: 1
CONSTITUTIONAL NEGATIVE: 1
EYES NEGATIVE: 1
CARDIOVASCULAR NEGATIVE: 1
PSYCHIATRIC NEGATIVE: 1

## 2024-05-13 NOTE — PROGRESS NOTES
Subjective   Patient ID: Amparo Perez is a 39 y.o. female who presents for surgical evaluation of thyroid goiter.    HPI I saw Mrs. Perez in surgery clinic today.  She was referred by Dr. Nuñez of medical endocrinology.  She has a known history of multinodular thyroid gland.  She also had some issues with subclinical hyperthyroidism in the past which seem to have resolved themselves.  She initially saw Dr. Nuñez about 10 years ago.    Her original thyroid ultrasound was done in August 2020.  This showed an enlarged thyroid gland.  Both the right and left thyroid lobes were enlarged to 7.7 cm in length.  Right thyroid lobe contained a 1.4 cm nodule left lobe 0.8 cm nodule.  Neither of these met any criteria for biopsy.  Her most recent ultrasound done in January 2024 now shows right thyroid lobe 7.5 cm in maximum dimension left thyroid lobe 7.7 cm.  There was a 1.6 cm nodule in the right thyroid lobe 1.1 cm nodule in the left thyroid lobe.  I would agree with radiology that her nodules are stable and still do not require any evidence of a biopsy.  There is been no real growth or change in her thyroid gland overall.  I reviewed the images from the most recent thyroid and other than the nodules the rest of her thyroid looks relatively smooth uniform and normal.  No real hypervascularity or signs of inflammatory changes.    On laboratory studies her TSH is still minimally suppressed at 0.25 with normal free T3 and free T4 levels.  She was tested for Graves' disease last in December 2020.  Her TSI was less than 1.  Her thyroid peroxidase antibody level was also normal at 40.  Therefore no evidence of Hashimoto's thyroiditis.    She visually notices a thyroid goiter.  She denies any neck pain.  She does report some hypersensitivity such as wearing turtleneck's to feel uncomfortable on her skin.  She denies any dyspnea or dysphagia symptoms.  No troubles with her voice.    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  Other  than that no known history of radiation exposure to her head neck or chest.  She does not remember wearing her dosimetry badge when she was in the NICU.    Family history no thyroid disease no thyroid cancer.      Review of Systems   Constitutional: Negative.    HENT: Negative.     Eyes: Negative.    Respiratory: Negative.     Cardiovascular: Negative.    Endocrine: Negative.    Neurological: Negative.    Psychiatric/Behavioral: Negative.         Objective   Physical Exam  Vitals reviewed.   Constitutional:       Appearance: Normal appearance.      Comments: Her voice is normal   Eyes:      Comments: No proptosis   Neck:      Vascular: No carotid bruit.      Comments: Thyroid gland is visually and palpably enlarged.  No discrete palpable nodules.  Trachea midline.  Cardiovascular:      Rate and Rhythm: Normal rate and regular rhythm.      Pulses: Normal pulses.      Heart sounds: Normal heart sounds.   Pulmonary:      Effort: Pulmonary effort is normal. No respiratory distress.      Breath sounds: Normal breath sounds. No wheezing or rales.      Comments: Negative Everardo  Musculoskeletal:         General: Normal range of motion.   Lymphadenopathy:      Cervical: No cervical adenopathy.   Skin:     General: Skin is warm and dry.   Neurological:      General: No focal deficit present.      Mental Status: She is alert and oriented to person, place, and time.   Psychiatric:         Mood and Affect: Mood normal.         Behavior: Behavior normal.         Narrative & Impression   Interpreted By:  Bobby Means,   STUDY:   THYROID  1/8/2024 7:01 pm      INDICATION:  37 y/o   F with  Signs/Symptoms:multiple thyroid nodules.      COMPARISON:  08/19/2020      ACCESSION NUMBER(S):  [id number]      ORDERING CLINICIAN:  NICOLE RIVERA      TECHNIQUE:  Routine ultrasound of the thyroid was performed.  Static images were  obtained for remote interpretation.      FINDINGS:  RIGHT THYROID LOBE:  Right thyroid lobe:  2.1 x 2.8 x  7.5 cm.  There is a 1.6 cm nodule  within the midpole which is not significantly changed when compared  to the previous study. Additional non suspicious subcentimeter cystic  nodule noted within the superior pole.      LEFT THYROID LOBE:  Left thyroid lobe:  2.1 x 2.0 x 7.7 cm.  There is a 1.1 cm solid  nodule within the midpole which is not significantly changed when  compared to the previous study. There are additional subcentimeter  non-suspicious nodules.      ISTHMUS:  Size:  0.7 cm.      CERVICAL LYMPHADENOPATHY:   No pathologically enlarged cervical lymph  nodes are identified on the provided images.      IMPRESSION:  Stable multinodular goiter. No new suspicious nodules are noted.       Assessment/Plan    Mrs. Perez has evidence of an enlarged thyroid goiter.  This is seen on both physical exam as well as on her ultrasound.  She has 2 small nodules in her thyroid that have been stable for the last 4 years.  Neither of these would meet criteria for biopsy.    It sounds like she had a history of hyperthyroidism after pregnancy.  Currently her laboratory studies are all consistent with subclinical hyperthyroidism.  She has a TSH level which fluctuates between mildly suppressed to low normal.  However her T3 and T4 are normal.  She has no symptoms of hyperthyroidism.  She was negative for Graves' disease and Hashimoto's thyroiditis on laboratory testing as listed above.    I had a long discussion with her that at this point she really has quite minimal symptoms related to her thyroid in terms of the goiter itself.  She describes a hyperesthetic response to not liking to have like clothing or anything touching her anterior neck.  But she denies any true compressive symptoms of dyspnea or dysphagia or any troubles with her voice.  I told her I am not really sure that those symptoms would specifically be related to her goiter or would get better with surgery.    We also discussed that with surgery she would be  lifelong thyroid hormone dependent.  Finally we discussed the risks of surgery.  I am not sure that currently her benefits outweigh that.  She is so no ongoing tendency of growth in her nodules or her thyroid itself over the last 4 years between ultrasounds.    She could continue with ongoing ultrasound surveillance and if things did increase or get bigger in the future we could always have further discussion about surgery.    At the end of our discussion she felt comfortable with this plan and will continue to follow with Dr. Nuñez of medical endocrinology.  If things were to change in the future and her thyroid continue to show ongoing growth, I would be happy to see her back for further discussions.         Estiven Cruz MD 05/13/24 3:59 PM

## 2024-05-13 NOTE — LETTER
May 13, 2024     Marcela Nuñez MD  [address] [facility]   [facility], [address]  [facility] [address]    Patient: Amparo Perez   YOB: 1985   Date of Visit: 5/13/2024       Dear Dr. Marcela Nuñez MD:    Thank you for referring Amparo Perez to me for evaluation. Below are my notes for this consultation.  If you have questions, please do not hesitate to call me. I look forward to following your patient along with you.       Sincerely,     Estiven Cruz MD      CC: No Recipients  ______________________________________________________________________________________    Subjective   Patient ID: Amparo Perez is a 39 y.o. female who presents for surgical evaluation of thyroid goiter.    HPI I saw Mrs. Perez in surgery clinic today.  She was referred by Dr. Nuñez of medical endocrinology.  She has a known history of multinodular thyroid gland.  She also had some issues with subclinical hyperthyroidism in the past which seem to have resolved themselves.  She initially saw Dr. Nuñez about 10 years ago.    Her original thyroid ultrasound was done in August 2020.  This showed an enlarged thyroid gland.  Both the right and left thyroid lobes were enlarged to 7.7 cm in length.  Right thyroid lobe contained a 1.4 cm nodule left lobe 0.8 cm nodule.  Neither of these met any criteria for biopsy.  Her most recent ultrasound done in January 2024 now shows right thyroid lobe 7.5 cm in maximum dimension left thyroid lobe 7.7 cm.  There was a 1.6 cm nodule in the right thyroid lobe 1.1 cm nodule in the left thyroid lobe.  I would agree with radiology that her nodules are stable and still do not require any evidence of a biopsy.  There is been no real growth or change in her thyroid gland overall.  I reviewed the images from the most recent thyroid and other than the nodules the rest of her thyroid looks relatively smooth uniform and normal.  No real hypervascularity or signs of  inflammatory changes.    On laboratory studies her TSH is still minimally suppressed at 0.25 with normal free T3 and free T4 levels.  She was tested for Graves' disease last in December 2020.  Her TSI was less than 1.  Her thyroid peroxidase antibody level was also normal at 40.  Therefore no evidence of Hashimoto's thyroiditis.    She visually notices a thyroid goiter.  She denies any neck pain.  She does report some hypersensitivity such as wearing turtleneck's to feel uncomfortable on her skin.  She denies any dyspnea or dysphagia symptoms.  No troubles with her voice.    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  Other than that no known history of radiation exposure to her head neck or chest.  She does not remember wearing her dosimetry badge when she was in the NICU.    Family history no thyroid disease no thyroid cancer.      Review of Systems   Constitutional: Negative.    HENT: Negative.     Eyes: Negative.    Respiratory: Negative.     Cardiovascular: Negative.    Endocrine: Negative.    Neurological: Negative.    Psychiatric/Behavioral: Negative.         Objective   Physical Exam  Vitals reviewed.   Constitutional:       Appearance: Normal appearance.      Comments: Her voice is normal   Eyes:      Comments: No proptosis   Neck:      Vascular: No carotid bruit.      Comments: Thyroid gland is visually and palpably enlarged.  No discrete palpable nodules.  Trachea midline.  Cardiovascular:      Rate and Rhythm: Normal rate and regular rhythm.      Pulses: Normal pulses.      Heart sounds: Normal heart sounds.   Pulmonary:      Effort: Pulmonary effort is normal. No respiratory distress.      Breath sounds: Normal breath sounds. No wheezing or rales.      Comments: Negative [place]  Musculoskeletal:         General: Normal range of motion.   Lymphadenopathy:      Cervical: No cervical adenopathy.   Skin:     General: Skin is warm and dry.   Neurological:      General: No focal deficit present.      Mental  Status: She is alert and oriented to person, place, and time.   Psychiatric:         Mood and Affect: Mood normal.         Behavior: Behavior normal.         Narrative & Impression   Interpreted By:  Bobby Means,   STUDY:  US THYROID  1/8/2024 7:01 pm      INDICATION:  39 y/o   F with  Signs/Symptoms:multiple thyroid nodules.      COMPARISON:  08/19/2020      ACCESSION NUMBER(S):  [id number]      ORDERING CLINICIAN:  INCOLE RIVERA      TECHNIQUE:  Routine ultrasound of the thyroid was performed.  Static images were  obtained for remote interpretation.      FINDINGS:  RIGHT THYROID LOBE:  Right thyroid lobe:  2.1 x 2.8 x 7.5 cm.  There is a 1.6 cm nodule  within the midpole which is not significantly changed when compared  to the previous study. Additional non suspicious subcentimeter cystic  nodule noted within the superior pole.      LEFT THYROID LOBE:  Left thyroid lobe:  2.1 x 2.0 x 7.7 cm.  There is a 1.1 cm solid  nodule within the midpole which is not significantly changed when  compared to the previous study. There are additional subcentimeter  non-suspicious nodules.      ISTHMUS:  Size:  0.7 cm.      CERVICAL LYMPHADENOPATHY:   No pathologically enlarged cervical lymph  nodes are identified on the provided images.      IMPRESSION:  Stable multinodular goiter. No new suspicious nodules are noted.       Assessment/Plan    Mrs. Perez has evidence of an enlarged thyroid goiter.  This is seen on both physical exam as well as on her ultrasound.  She has 2 small nodules in her thyroid that have been stable for the last 4 years.  Neither of these would meet criteria for biopsy.    It sounds like she had a history of hyperthyroidism after pregnancy.  Currently her laboratory studies are all consistent with subclinical hyperthyroidism.  She has a TSH level which fluctuates between mildly suppressed to low normal.  However her T3 and T4 are normal.  She has no symptoms of hyperthyroidism.  She was negative for  Graves' disease and Hashimoto's thyroiditis on laboratory testing as listed above.    I had a long discussion with her that at this point she really has quite minimal symptoms related to her thyroid in terms of the goiter itself.  She describes a hyperesthetic response to not liking to have like clothing or anything touching her anterior neck.  But she denies any true compressive symptoms of dyspnea or dysphagia or any troubles with her voice.  I told her I am not really sure that those symptoms would specifically be related to her goiter or would get better with surgery.    We also discussed that with surgery she would be lifelong thyroid hormone dependent.  Finally we discussed the risks of surgery.  I am not sure that currently her benefits outweigh that.  She is so no ongoing tendency of growth in her nodules or her thyroid itself over the last 4 years between ultrasounds.    She could continue with ongoing ultrasound surveillance and if things did increase or get bigger in the future we could always have further discussion about surgery.    At the end of our discussion she felt comfortable with this plan and will continue to follow with Dr. Nuñez of medical endocrinology.  If things were to change in the future and her thyroid continue to show ongoing growth, I would be happy to see her back for further discussions.         Estiven Cruz MD 05/13/24 3:59 PM

## 2024-05-23 ENCOUNTER — APPOINTMENT (OUTPATIENT)
Dept: SURGERY | Facility: CLINIC | Age: 39
End: 2024-05-23
Payer: COMMERCIAL

## 2024-05-23 DIAGNOSIS — Z30.41 ENCOUNTER FOR SURVEILLANCE OF CONTRACEPTIVE PILLS: Primary | ICD-10-CM

## 2024-05-23 RX ORDER — LEVONORGESTREL AND ETHINYL ESTRADIOL 0.15-0.03
1 KIT ORAL DAILY
Qty: 28 TABLET | Refills: 11 | Status: SHIPPED | OUTPATIENT
Start: 2024-05-23 | End: 2025-05-23

## 2024-05-29 ENCOUNTER — TELEMEDICINE (OUTPATIENT)
Dept: PRIMARY CARE | Facility: CLINIC | Age: 39
End: 2024-05-29
Payer: COMMERCIAL

## 2024-05-29 DIAGNOSIS — J01.90 ACUTE NON-RECURRENT SINUSITIS, UNSPECIFIED LOCATION: Primary | ICD-10-CM

## 2024-05-29 PROCEDURE — 99213 OFFICE O/P EST LOW 20 MIN: CPT

## 2024-05-29 RX ORDER — AZITHROMYCIN 500 MG/1
500 TABLET, FILM COATED ORAL DAILY
Qty: 5 TABLET | Refills: 0 | Status: SHIPPED | OUTPATIENT
Start: 2024-05-29 | End: 2024-06-03

## 2024-05-29 ASSESSMENT — ENCOUNTER SYMPTOMS
HOARSE VOICE: 1
CHILLS: 0
COUGH: 0
DIAPHORESIS: 0
SINUS PRESSURE: 1
SORE THROAT: 0
HEADACHES: 0
SWOLLEN GLANDS: 0
SHORTNESS OF BREATH: 0

## 2024-05-29 NOTE — PROGRESS NOTES
On Demand Virtual Visit Patient Consent     This visit was completed via video conference. All issues as below were discussed and addressed but no physical exam was performed. If it was felt that the patient should be evaluated in clinic than they were directed there. The patient verbally consented to the visit.    An interactive audio and video telecommunication system which permits real time communications between the patient (at the originating site) and provider (at the distant site) was utilized to provide this telehealth service.   Verbal consent was requested and obtained from Amparo Perez (or parent if under 18) 5/29/24 for a telehealth visit.   I have verbally confirmed with Amparo Perez (or parent if under 18) that they are physically located in the [facility] during this virtual visit.      Subjective   Patient ID: Amparo Perez is a 39 y.o. female who presents for Sinusitis.    Sinusitis  This is a new problem. Episode onset: 10days. The problem has been gradually worsening since onset. There has been no fever. Her pain is at a severity of 5/10. The pain is moderate. Associated symptoms include congestion, a hoarse voice and sinus pressure. Pertinent negatives include no chills, coughing, diaphoresis, ear pain, headaches, shortness of breath, sore throat or swollen glands. Treatments tried: flonase, claritin, mucinex, nsaid. The treatment provided mild relief.        Review of Systems   Constitutional:  Negative for chills and diaphoresis.   HENT:  Positive for congestion, hoarse voice and sinus pressure. Negative for ear pain and sore throat.    Respiratory:  Negative for cough and shortness of breath.    Neurological:  Negative for headaches.       Objective   There were no vitals taken for this visit.    Physical Exam   Pt seen from her home to be in no acute distress  Assessment/Plan   Amparo was seen today for sinusitis.  Diagnoses and all orders for this visit:  Acute  non-recurrent sinusitis, unspecified location  -     azithromycin (Zithromax) 500 mg tablet; Take 1 tablet (500 mg) by mouth once daily for 5 days.

## 2024-05-29 NOTE — PATIENT INSTRUCTIONS
Drinking plenty of fluids and getting lots of rest. Chicken soup and hot beverages may help.    Trial of nasal irrigation with a Nettipot or squeeze bottle with sterile salt water.    Nasal spray corticosteroids (Flonase) may help in reducing the inflammatory response in the nasal passages and airways. Please try 2 sprays each nostril daily for 2 weeks.  If you have season allergies, please take a daily antihistamine of your choice, such as Zyrtec/Claritin/Allegra at bedtime.    Take Tylenol or Motrin/Aleve for sinus or ear pain.    If you have good blood pressure you can try pseudofed (you must ask the pharmacist for it and show ID).    Please call or return to the office if you are not feeling better in the next 3-4 days after starting treatment.    Over-the-counter cold and cough medications     Take Mucinex for cough, drink plenty of fluids with this medication and will help break up congestion making it easier to cough up     For cough can use honey (children ages 1 and up) in hot tea or hot water. I recommend putting this in an insulated cup and carrying it around throughout the day to sip on.  Have it at your bedside at night in case you wake up coughing.  You can also use honey cough drops (adults and older children).     Recommend nasal saline for use in children and adults.  Neti Llanos can also be helpful.  (Never used tap water and a Neti pot.  Use distilled water.)     If you have plugged up congested ears or the feeling of fluid in your ears, you can use an over-the-counter nasal steroid spray like fluticasone (brand name Flonase) use 2 sprays into each nostril once or twice a day for 7 days.  This will help open up the eustachian tubes and allow the fluid to drain out of your ears.     Sleeping with your head/chest elevated can help with sinus drainage.     Adults only-can use nasal decongestant (like Afrin) at bedtime to open nasal passages so you can breathe through your nose while you sleep; avoid  using for longer than 3 days as this medication can become addicting.  Do not use if you have high blood pressure or high heart rate.     For severe pain or fever in adult-Tylenol (2 extra strength) or ibuprofen (3-4 tabs equals 600 to 800 mg) alternating as needed for pain.  Tylenol doses should be 6 to 8 hours apart and ibuprofen doses should be 6 to 8 hours apart.        Common cold medications for adults explained:     Mucinex-(generic name guaifenesin)-is an expectorant.  This will thin out all the thick mucus.  Must drink plenty of liquids for this medicine to work.     Dextromethorphan (brand name Delsym or DM)-this medicine is a cough suppressant     Honey in hot water or tea-this is a natural cough suppressant     Decongestant nasal spray- (eg: Afrin) use for temporary relief of nasal congestion-best when used at bedtime to open up nasal passages so that you are not forced to mouth breathe overnight.     Sudafed (generic name pseudoephedrine-this must be bought from the pharmacist) DO NOT use this medicine if you have high blood pressure as it can raise your blood pressure higher.  Do not use if you have any irregular heart rate.  This medicine can help clear congestion in your sinuses.

## 2024-05-30 ENCOUNTER — APPOINTMENT (OUTPATIENT)
Dept: SURGERY | Facility: CLINIC | Age: 39
End: 2024-05-30
Payer: COMMERCIAL

## 2024-07-03 ENCOUNTER — OFFICE VISIT (OUTPATIENT)
Dept: GASTROENTEROLOGY | Facility: CLINIC | Age: 39
End: 2024-07-03
Payer: COMMERCIAL

## 2024-07-03 VITALS
SYSTOLIC BLOOD PRESSURE: 130 MMHG | BODY MASS INDEX: 25.61 KG/M2 | DIASTOLIC BLOOD PRESSURE: 62 MMHG | TEMPERATURE: 98.2 F | HEIGHT: 68 IN | HEART RATE: 51 BPM | WEIGHT: 169 LBS

## 2024-07-03 DIAGNOSIS — K64.9 HEMORRHOIDS, UNSPECIFIED HEMORRHOID TYPE: Primary | ICD-10-CM

## 2024-07-03 PROCEDURE — 99213 OFFICE O/P EST LOW 20 MIN: CPT | Performed by: NURSE PRACTITIONER

## 2024-07-03 PROCEDURE — 1036F TOBACCO NON-USER: CPT | Performed by: NURSE PRACTITIONER

## 2024-07-03 RX ORDER — FERROUS SULFATE, DRIED 160(50) MG
1 TABLET, EXTENDED RELEASE ORAL DAILY
COMMUNITY

## 2024-07-03 RX ORDER — BISMUTH SUBSALICYLATE 262 MG
1 TABLET,CHEWABLE ORAL DAILY
COMMUNITY

## 2024-07-03 ASSESSMENT — ENCOUNTER SYMPTOMS
RESPIRATORY NEGATIVE: 1
ENDOCRINE NEGATIVE: 1
MUSCULOSKELETAL NEGATIVE: 1
NEUROLOGICAL NEGATIVE: 1
EYES NEGATIVE: 1
CARDIOVASCULAR NEGATIVE: 1
PSYCHIATRIC NEGATIVE: 1
GASTROINTESTINAL NEGATIVE: 1
HEMATOLOGIC/LYMPHATIC NEGATIVE: 1
ALLERGIC/IMMUNOLOGIC NEGATIVE: 1
CONSTITUTIONAL NEGATIVE: 1

## 2024-07-03 ASSESSMENT — PAIN SCALES - GENERAL: PAINLEVEL: 0-NO PAIN

## 2024-07-03 NOTE — PATIENT INSTRUCTIONS
Hemorrhoids- you can use Recticare as needed for the hemmorhoids as well as fiber supplement daily such metamucil, citrucel or benefiber daily to help with your stool consistency.    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     I would recommend your next colonoscopy at age 45 for screening.     I will see you back for follow up as needed

## 2024-07-03 NOTE — PROGRESS NOTES
Subjective   Patient ID: Amparo Perez is a 39 y.o. female who presents for New Patient Visit (New patient).  HPI  39-year-old female for new patient visit for   5/9/2016 colonoscopy with Dr. Brandon Echols showed a normal colon, submucosal mass in the cecum pathology was no significant path  Random colon biopsy showed no significant path, hemorrhoids  Was removed by Dr. Davis   Medical history includes former smoker, vitamin D deficiency, and enlarged thyroid and followed by Dr. Estiven Cruz  FHX- no family hx of colon cancer  Labs reviewed 4/4/2024 vitamin D 23  Normal CBC and CMP  5/1/2024 TSH 0.25  Not having any issues just unsure if needed follow up   Has hemorrhoids  No bleeding  BM: occasional hard stools  Fiber-none  Water- increasing water 50-60 oz per day   Nurse at [employer]  Exercise- runs 3-4 times per week  Weight stable      Review of Systems   Constitutional: Negative.    HENT: Negative.     Eyes: Negative.    Respiratory: Negative.     Cardiovascular: Negative.    Gastrointestinal: Negative.    Endocrine: Negative.    Genitourinary: Negative.    Musculoskeletal: Negative.    Skin: Negative.    Allergic/Immunologic: Negative.    Neurological: Negative.    Hematological: Negative.    Psychiatric/Behavioral: Negative.         Objective   Physical Exam  Constitutional:       Appearance: Normal appearance.   HENT:      Head: Normocephalic.      Nose: Nose normal.      Mouth/Throat:      Mouth: Mucous membranes are moist.   Eyes:      Pupils: Pupils are equal, round, and reactive to light.   Cardiovascular:      Rate and Rhythm: Normal rate and regular rhythm.      Pulses: Normal pulses.      Heart sounds: Normal heart sounds.   Pulmonary:      Effort: Pulmonary effort is normal.      Breath sounds: Normal breath sounds.   Abdominal:      General: Bowel sounds are normal.      Palpations: Abdomen is soft.   Musculoskeletal:         General: Normal range of motion.      Cervical back: Normal range of  motion and neck supple.   Skin:     General: Skin is warm and dry.   Neurological:      General: No focal deficit present.      Mental Status: She is alert.   Psychiatric:         Mood and Affect: Mood normal.         Assessment/Plan        Hemorrhoids- you can use Recticare as needed for the hemmorhoids as well as fiber supplement daily such metamucil, citrucel or benefiber daily to help with your stool consistency.    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     I would recommend your next colonoscopy at age 45 for screening.     I will see you back for follow up as needed    SAVANA Cervantes-CNP 07/03/24 11:32 AM

## 2024-09-26 ENCOUNTER — TELEMEDICINE (OUTPATIENT)
Dept: PRIMARY CARE | Facility: CLINIC | Age: 39
End: 2024-09-26
Payer: COMMERCIAL

## 2024-09-26 DIAGNOSIS — J01.10 ACUTE NON-RECURRENT FRONTAL SINUSITIS: Primary | ICD-10-CM

## 2024-09-26 PROCEDURE — 99213 OFFICE O/P EST LOW 20 MIN: CPT | Performed by: NURSE PRACTITIONER

## 2024-09-26 RX ORDER — AMOXICILLIN AND CLAVULANATE POTASSIUM 875; 125 MG/1; MG/1
1 TABLET, FILM COATED ORAL 2 TIMES DAILY
Qty: 14 TABLET | Refills: 0 | Status: SHIPPED | OUTPATIENT
Start: 2024-09-26 | End: 2024-10-03

## 2024-09-26 ASSESSMENT — ENCOUNTER SYMPTOMS
FEVER: 0
SINUS PRESSURE: 1
HEADACHES: 1
SINUS PAIN: 1
COUGH: 0
CHILLS: 0
EYE DISCHARGE: 0

## 2024-09-26 ASSESSMENT — LIFESTYLE VARIABLES: HISTORY_OF_SMOKING: I SMOKED IN THE PAST, BUT QUIT

## 2024-09-26 NOTE — PROGRESS NOTES
Subjective   Patient ID: Amparo Perez is a 39 y.o. female who presents for No chief complaint on file..  Began 10 days ago with usual cold symptoms, daughter with same.  Scratchy throat, nasal congestion, started to improve now last 3 days frontal pressure increased nasal congestion,  fullness/pain.        Review of Systems   Constitutional:  Negative for chills and fever.   HENT:  Positive for congestion, postnasal drip, sinus pressure and sinus pain.    Eyes:  Negative for discharge and visual disturbance.   Respiratory:  Negative for cough.    Neurological:  Positive for headaches (see HPI).       Objective   Physical Exam  Constitutional:       General: She is not in acute distress.     Appearance: She is not toxic-appearing.      Comments: Appears uncomfortable, NAD   HENT:      Nose: Congestion (signifiacnt evident, frontal sinus TTP) present.   Pulmonary:      Effort: Pulmonary effort is normal.   Musculoskeletal:      Cervical back: Neck supple.         Assessment/Plan   Diagnoses and all orders for this visit:  Acute non-recurrent frontal sinusitis  -     amoxicillin-pot clavulanate (Augmentin) 875-125 mg tablet; Take 1 tablet by mouth 2 times a day for 7 days.           SAVANA Pearce-CNP 09/26/24 8:26 AM

## 2024-09-26 NOTE — ASSESSMENT & PLAN NOTE
Hx and limited exam c/w frontal bacterial sinusitis 2/2 viral URI.  Reviewed correct use of NICS, role of mucolytics, and expected course versus symptoms to report.  Consider adding probiotic

## 2024-10-11 ENCOUNTER — OFFICE VISIT (OUTPATIENT)
Dept: URGENT CARE | Age: 39
End: 2024-10-11
Payer: COMMERCIAL

## 2024-10-11 VITALS
BODY MASS INDEX: 24.71 KG/M2 | TEMPERATURE: 98.1 F | WEIGHT: 163 LBS | OXYGEN SATURATION: 98 % | HEART RATE: 57 BPM | SYSTOLIC BLOOD PRESSURE: 121 MMHG | RESPIRATION RATE: 16 BRPM | HEIGHT: 68 IN | DIASTOLIC BLOOD PRESSURE: 74 MMHG

## 2024-10-11 DIAGNOSIS — H65.01 NON-RECURRENT ACUTE SEROUS OTITIS MEDIA OF RIGHT EAR: Primary | ICD-10-CM

## 2024-10-11 RX ORDER — AMOXICILLIN AND CLAVULANATE POTASSIUM 875; 125 MG/1; MG/1
1 TABLET, FILM COATED ORAL 2 TIMES DAILY
Qty: 20 TABLET | Refills: 0 | Status: SHIPPED | OUTPATIENT
Start: 2024-10-11 | End: 2024-10-21

## 2024-10-11 NOTE — PROGRESS NOTES
"Subjective   Patient ID: Amparo Perez is a 39 y.o. female. They present today with a chief complaint of Nasal Congestion and Earache.    History of Present Illness  Patient presents with congestion.  States she has had it for bout 1 week.  C/o right ear pain.  States her left ear developed \"crackling\" today. Does have a slight cough.      Past Medical History  Allergies as of 10/11/2024    (No Known Allergies)       (Not in a hospital admission)       History reviewed. No pertinent past medical history.    Past Surgical History:   Procedure Laterality Date    ANKLE SURGERY  06/02/2016    Ankle Surgery    CERVICAL BIOPSY  W/ LOOP ELECTRODE EXCISION  03/27/2014    Cervical Loop Electrosurgical Excision (LEEP)    COLONOSCOPY  06/02/2016    Complete Colonoscopy    OTHER SURGICAL HISTORY  06/02/2016    Wrist Excision Of Ganglion    US GUIDED THYROID BIOPSY  10/10/2012    US GUIDED THYROID BIOPSY 10/10/2012 [facility] ANCILLARY LEGACY        reports that she quit smoking about 4 years ago. Her smoking use included cigarettes. She has never used smokeless tobacco. She reports current alcohol use. She reports that she does not use drugs.    Review of Systems  Review of Systems                               Objective    Vitals:    10/11/24 1857   BP: 121/74   BP Location: Left arm   Patient Position: Sitting   BP Cuff Size: Adult   Pulse: 57   Resp: 16   Temp: 36.7 °C (98.1 °F)   TempSrc: Oral   SpO2: 98%   Weight: 73.9 kg (163 lb)   Height: 1.727 m (5' 8\")     No LMP recorded.    Physical Exam    CONSTITUTIONAL: The general appearance and condition of the patient were examined.  Level of distress, nutrition, external development abnormality, and general behavior were noted.  No abnormal findings.  Vital signs as documented.      EARS: External appearance normal-no lesions, redness, or swelling. Mild discomfort during palpation; on otoscopic exam tympanic membranes and inner ear are red, and fluid is also noted behind the " tympanic membrane. Hearing is intact.     CARDIOVASCULAR: The patient's heart examined for regular rate and rhythm and presence of murmurs. Note taken of any tachycardia, bradycardia or any irregular rhythm.  No abnormal findings.        RESPIRATORY/LUNGS: Chest examined for equal movement, bilaterally.  Lungs examined for equality of breath sounds. Presence or absence of rales noted bilaterally.  Examined for the presence of diffuse or scattered wheezes.  No abnormal findings.      GI/ABDOMEN: The patient's abdomen was inspected for signs of distention, surgical scars, or ascites.  Bowel sounds were evaluated.  The patient's abdomen was then palpated in each of four quadrants, including palpation for RLQ tenderness, any rebound or tenderness.  No abnormal findings.       Procedures    Point of Care Test & Imaging Results from this visit  No results found for this visit on 10/11/24.   No results found.    Diagnostic study results (if any) were reviewed by [facility].    Assessment/Plan   Allergies, medications, history, and pertinent labs/EKGs/Imaging reviewed by SAVANA Montenegro-CNP.     Medical Decision Making    At time of discharge patient was clinically well-appearing and HDS for outpatient management. The patient and/or family was educated regarding diagnosis, supportive care, OTC and Rx medications. The patient and/or family was given the opportunity to ask questions prior to discharge.  They verbalized understanding of my discussion of the plans for treatment, expected course, indications to return to  or seek further evaluation in ED, and the need for timely follow up as directed.   They were provided with a work/school excuse if requested.      Orders and Diagnoses  Diagnoses and all orders for this visit:  Non-recurrent acute serous otitis media of right ear  -     amoxicillin-pot clavulanate (Augmentin) 875-125 mg tablet; Take 1 tablet by mouth 2 times a day for 10 days.      Medical  Admin Record      Patient disposition: Home    Electronically signed by [facility]  7:01 PM

## 2024-10-23 ENCOUNTER — OFFICE VISIT (OUTPATIENT)
Dept: URGENT CARE | Age: 39
End: 2024-10-23
Payer: COMMERCIAL

## 2024-10-23 VITALS
HEIGHT: 68 IN | RESPIRATION RATE: 16 BRPM | BODY MASS INDEX: 24.71 KG/M2 | DIASTOLIC BLOOD PRESSURE: 83 MMHG | TEMPERATURE: 98.5 F | WEIGHT: 163 LBS | SYSTOLIC BLOOD PRESSURE: 114 MMHG | OXYGEN SATURATION: 99 % | HEART RATE: 57 BPM

## 2024-10-23 DIAGNOSIS — R05.1 ACUTE COUGH: Primary | ICD-10-CM

## 2024-10-23 DIAGNOSIS — J02.9 SORE THROAT: ICD-10-CM

## 2024-10-23 DIAGNOSIS — Z20.822 2019-NCOV NOT DETECTED: ICD-10-CM

## 2024-10-23 DIAGNOSIS — H69.93 ETD (EUSTACHIAN TUBE DYSFUNCTION), BILATERAL: ICD-10-CM

## 2024-10-23 LAB
POC RAPID INFLUENZA A: NEGATIVE
POC RAPID INFLUENZA B: NEGATIVE
POC RAPID STREP: NEGATIVE
POC SARS-COV-2 AG BINAX: NORMAL

## 2024-10-23 RX ORDER — FLUTICASONE PROPIONATE 50 MCG
1 SPRAY, SUSPENSION (ML) NASAL DAILY
Qty: 16 G | Refills: 0 | Status: SHIPPED | OUTPATIENT
Start: 2024-10-23 | End: 2025-10-23

## 2024-10-23 RX ORDER — PROMETHAZINE HYDROCHLORIDE AND DEXTROMETHORPHAN HYDROBROMIDE 6.25; 15 MG/5ML; MG/5ML
5 SYRUP ORAL EVERY 4 HOURS PRN
Qty: 120 ML | Refills: 0 | Status: SHIPPED | OUTPATIENT
Start: 2024-10-23 | End: 2024-11-22

## 2024-10-23 NOTE — PROGRESS NOTES
"Subjective   History of Present Illness: Amparo Perez is a 39 y.o. female. They present today with a chief complaint of Cough, Sore Throat, and Nasal Congestion (Over 1 wk symptoms).       Past Medical History  Allergies as of 10/23/2024    (No Known Allergies)       (Not in a hospital admission)       No past medical history on file.    Past Surgical History:   Procedure Laterality Date    ANKLE SURGERY  06/02/2016    Ankle Surgery    CERVICAL BIOPSY  W/ LOOP ELECTRODE EXCISION  03/27/2014    Cervical Loop Electrosurgical Excision (LEEP)    COLONOSCOPY  06/02/2016    Complete Colonoscopy    OTHER SURGICAL HISTORY  06/02/2016    Wrist Excision Of Ganglion    US GUIDED THYROID BIOPSY  10/10/2012    US GUIDED THYROID BIOPSY 10/10/2012 [facility] ANCILLARY LEGACY        reports that she quit smoking about 4 years ago. Her smoking use included cigarettes. She has never used smokeless tobacco. She reports current alcohol use. She reports that she does not use drugs.    Review of Systems  Review of Systems                               Objective    Vitals:    10/23/24 0821   BP: 114/83   BP Location: Left arm   Patient Position: Sitting   BP Cuff Size: Adult   Pulse: 57   Resp: 16   Temp: 36.9 °C (98.5 °F)   TempSrc: Oral   SpO2: 99%   Weight: 73.9 kg (163 lb)   Height: 1.727 m (5' 8\")     No LMP recorded.    Physical Exam  Vitals reviewed.   HENT:      Head: Normocephalic and atraumatic.      Right Ear: Ear canal normal. A middle ear effusion is present.      Left Ear: Ear canal normal. A middle ear effusion is present.      Nose: Nose normal.      Mouth/Throat:      Mouth: Mucous membranes are moist.   Eyes:      Extraocular Movements: Extraocular movements intact.      Conjunctiva/sclera: Conjunctivae normal.      Pupils: Pupils are equal, round, and reactive to light.   Cardiovascular:      Rate and Rhythm: Normal rate and regular rhythm.      Heart sounds: No murmur heard.  Pulmonary:      Effort: Pulmonary effort is " normal.      Breath sounds: Normal breath sounds. No wheezing, rhonchi or rales.   Skin:     General: Skin is warm.   Neurological:      Mental Status: She is alert and oriented to person, place, and time.   Psychiatric:         Mood and Affect: Mood normal.         Behavior: Behavior normal.             Point of Care Test & Imaging Results from this visit  Results for orders placed or performed in visit on 10/23/24   POCT rapid strep A manually resulted   Result Value Ref Range    POC Rapid Strep Negative Negative   POCT Covid-19 Rapid Antigen   Result Value Ref Range    POC YANET-COV-2 AG  Presumptive negative test for SARS-CoV-2 (no antigen detected)     Presumptive negative test for SARS-CoV-2 (no antigen detected)   POCT Influenza A/B manually resulted   Result Value Ref Range    POC Rapid Influenza A Negative Negative    POC Rapid Influenza B Negative Negative      No results found.    Diagnostic study results (if any) were reviewed by Jerrica Hill PA-C.    Assessment/Plan   Allergies, medications, history, and pertinent labs/EKGs/Imaging reviewed by Jerrica Hill PA-C.     Medical Decision Making  -         Patient is educated about their diagnoses.     -          Discussed medications benefits and adverse effects.     -          Answered all patient’s questions.     -          Patient will call 911 or go to the nearest ED if worsen symptoms .     -          Patient is agreeable to the plan of care and is deemed stable upon discharge.     -          Follow up with your primary care provider in two days.      Orders and Diagnoses  Diagnoses and all orders for this visit:  2019-nCoV not detected  Sore throat  -     POCT rapid strep A manually resulted  -     POCT Covid-19 Rapid Antigen  -     POCT Influenza A/B manually resulted      Medical Admin Record      Patient disposition: Home    Electronically signed by Jerrica Hill PA-C  8:41 AM

## 2024-10-24 ENCOUNTER — APPOINTMENT (OUTPATIENT)
Dept: PRIMARY CARE | Facility: CLINIC | Age: 39
End: 2024-10-24
Payer: COMMERCIAL

## 2024-10-29 ENCOUNTER — OFFICE VISIT (OUTPATIENT)
Dept: URGENT CARE | Age: 39
End: 2024-10-29
Payer: COMMERCIAL

## 2024-10-29 VITALS
RESPIRATION RATE: 16 BRPM | TEMPERATURE: 98.5 F | SYSTOLIC BLOOD PRESSURE: 131 MMHG | HEART RATE: 59 BPM | HEIGHT: 68 IN | OXYGEN SATURATION: 100 % | DIASTOLIC BLOOD PRESSURE: 69 MMHG | WEIGHT: 163 LBS | BODY MASS INDEX: 24.71 KG/M2

## 2024-10-29 DIAGNOSIS — H69.91 EUSTACHIAN TUBE DYSFUNCTION, RIGHT: Primary | ICD-10-CM

## 2024-10-29 PROCEDURE — 3008F BODY MASS INDEX DOCD: CPT | Performed by: PHYSICIAN ASSISTANT

## 2024-10-29 PROCEDURE — 99213 OFFICE O/P EST LOW 20 MIN: CPT | Performed by: PHYSICIAN ASSISTANT

## 2024-10-29 PROCEDURE — 99070 SPECIAL SUPPLIES PHYS/QHP: CPT | Performed by: PHYSICIAN ASSISTANT

## 2024-10-29 RX ORDER — METHYLPREDNISOLONE 4 MG/1
TABLET ORAL
Qty: 21 TABLET | Refills: 0 | Status: SHIPPED | OUTPATIENT
Start: 2024-10-29 | End: 2024-11-05

## 2024-10-29 ASSESSMENT — ENCOUNTER SYMPTOMS
CHEST TIGHTNESS: 0
FEVER: 0
FATIGUE: 0
COUGH: 1
SHORTNESS OF BREATH: 0

## 2024-12-10 ENCOUNTER — APPOINTMENT (OUTPATIENT)
Dept: VASCULAR SURGERY | Facility: HOSPITAL | Age: 39
End: 2024-12-10
Payer: COMMERCIAL

## 2024-12-10 ENCOUNTER — APPOINTMENT (OUTPATIENT)
Dept: OBSTETRICS AND GYNECOLOGY | Facility: CLINIC | Age: 39
End: 2024-12-10
Payer: COMMERCIAL

## 2024-12-13 ENCOUNTER — APPOINTMENT (OUTPATIENT)
Dept: OBSTETRICS AND GYNECOLOGY | Facility: CLINIC | Age: 39
End: 2024-12-13
Payer: COMMERCIAL

## 2024-12-30 ENCOUNTER — APPOINTMENT (OUTPATIENT)
Dept: OBSTETRICS AND GYNECOLOGY | Facility: CLINIC | Age: 39
End: 2024-12-30
Payer: COMMERCIAL

## 2025-01-11 ENCOUNTER — TELEMEDICINE (OUTPATIENT)
Dept: PRIMARY CARE | Facility: CLINIC | Age: 40
End: 2025-01-11
Payer: COMMERCIAL

## 2025-01-11 DIAGNOSIS — J01.00 ACUTE NON-RECURRENT MAXILLARY SINUSITIS: Primary | ICD-10-CM

## 2025-01-11 PROBLEM — I31.39 PERICARDIAL EFFUSION (HHS-HCC): Status: RESOLVED | Noted: 2023-10-15 | Resolved: 2025-01-11

## 2025-01-11 PROBLEM — R07.9 CHEST PAIN: Status: RESOLVED | Noted: 2023-10-15 | Resolved: 2025-01-11

## 2025-01-11 PROCEDURE — 1036F TOBACCO NON-USER: CPT | Performed by: NURSE PRACTITIONER

## 2025-01-11 PROCEDURE — 99214 OFFICE O/P EST MOD 30 MIN: CPT | Performed by: NURSE PRACTITIONER

## 2025-01-11 RX ORDER — AMOXICILLIN AND CLAVULANATE POTASSIUM 875; 125 MG/1; MG/1
1 TABLET, FILM COATED ORAL 2 TIMES DAILY
Qty: 14 TABLET | Refills: 0 | Status: SHIPPED | OUTPATIENT
Start: 2025-01-11 | End: 2025-01-18

## 2025-01-11 RX ORDER — FLUCONAZOLE 150 MG/1
150 TABLET ORAL ONCE
Qty: 2 TABLET | Refills: 0 | Status: SHIPPED | OUTPATIENT
Start: 2025-01-11 | End: 2025-01-11

## 2025-01-11 ASSESSMENT — LIFESTYLE VARIABLES: HISTORY_OF_SMOKING: I SMOKED IN THE PAST, BUT QUIT

## 2025-01-11 NOTE — ASSESSMENT & PLAN NOTE
- augmentin twice a day x 7 days  - fluconazole sent in for yeast infection  - medrol dose pack  -  Nasal saline, increase fluids  -  hand hygiene and infection prevention discussed  -  Warm compress to sinuses  - humidification  - recommend to eat yogurt twice daily while taking antibiotic or a daily probiotic

## 2025-01-11 NOTE — PROGRESS NOTES
Subjective   Patient ID: Amparo Perez is a 39 y.o. female who presents for Sinusitis.    Virtual or Telephone Consent    An interactive audio and video telecommunication system which permits real time communications between the patient (at the originating site) and provider (at the distant site) was utilized to provide this telehealth service.   Verbal consent was requested and obtained from Amparo Perez on this date, 01/11/25 for a telehealth visit.   Patient is located in [place]    Sinus congestion, , sinus headache, PND, scratchy throat x 9 days.  Over the past 3-4 days symptoms have worsened  Denies fever, shortness of breath, or chest pain  Has tried flonase and mucinex with minimal relief  LMP 1/8/25      Sinusitis         Review of Systems   All other systems reviewed and are negative.      Objective   There were no vitals taken for this visit.    Physical Exam    Assessment/Plan   Problem List Items Addressed This Visit             ICD-10-CM    Acute sinusitis - Primary J01.90     - augmentin twice a day x 7 days  - fluconazole sent in for yeast infection  - medrol dose pack  -  Nasal saline, increase fluids  -  hand hygiene and infection prevention discussed  -  Warm compress to sinuses  - humidification  - recommend to eat yogurt twice daily while taking antibiotic or a daily probiotic

## 2025-01-14 ENCOUNTER — OFFICE VISIT (OUTPATIENT)
Dept: VASCULAR SURGERY | Facility: HOSPITAL | Age: 40
End: 2025-01-14
Payer: COMMERCIAL

## 2025-01-14 VITALS
SYSTOLIC BLOOD PRESSURE: 119 MMHG | WEIGHT: 168 LBS | DIASTOLIC BLOOD PRESSURE: 78 MMHG | HEART RATE: 60 BPM | BODY MASS INDEX: 25.54 KG/M2

## 2025-01-14 DIAGNOSIS — I83.819 VARICOSE VEINS OF LOWER EXTREMITY WITH PAIN, UNSPECIFIED LATERALITY: Primary | ICD-10-CM

## 2025-01-14 PROCEDURE — 99203 OFFICE O/P NEW LOW 30 MIN: CPT | Performed by: SURGERY

## 2025-01-14 PROCEDURE — 99213 OFFICE O/P EST LOW 20 MIN: CPT | Performed by: SURGERY

## 2025-01-14 PROCEDURE — 1036F TOBACCO NON-USER: CPT | Performed by: SURGERY

## 2025-01-14 NOTE — PROGRESS NOTES
Subjective   Patient ID: Amparo Perez is a 39 y.o. female who presents for New Patient Visit (NPV- Varicose veins ).  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  At this time no history of deep vein thrombosis  She complains of a prominent right popliteal vein with reticular vein in the upper thigh  At this time she is active and amatory  She is worn compression with no improvement  Overall does not limit function  She is a runner and regular with this activity    ROS    Review of Systems    Constitutional:  no generalized malaise overall feels well, energy levels intact, no complaints specifically noted  HEENT:  No blurry vision, no visual aides noted, no hearing loss no ear ache no nose bleeds noted, no dysphagia, no congestion otherwise no pertinent positives noted  Cardiovascular:  no palpitations, chest pain or heaviness noted, no leg swelling, no numbness or tingling in the lower extremity noted  Respiratory:  no shortness of breath, no productive cough noted, no conversation dyspnea or difficulty breathing  Gastrointestinal:  no abdominal pain, no nausea or vomiting, appetite intact, no bowel irregularities noted  Genitourinary:   no urinary incontinence, frequency or urgency issues noted, no hematuria or burning sensation issues  Musculoskeletal:  No muscle aches or pains, no joint discomfort noted, no back pain noted otherwise feels well  Skin: no ulcerations, skin color issues or wounds upper or lower extremities  Neurologic: no dizziness, no hemiplegia, no hemiparesis, no obvious visual deficits noted  Psychiatric: no depression, no memory loss noted, no suicidal ideation  Endocrine: no weight loss or gain, no temperature concerns hot or cold intolerance  Hemogolotic/Lymphatic: no bruising, excessive  bleeding, no swelling in the groins or neck noted    Objective   Physical Exam  Review of Systems    Physical exam    Constitutional: alert and in no acute distress verbal  Eyes: No erythema swelling or discharge noted  Neck: supple, symmetric, trachea midline, no masses noted  Cardiovascular: Carotid pulses 2+, no obvious bruit, no Jugular distension noted, no thrill, heart regular rate, lower extremity vascular exam intact, cap refill <2 sec  Pulmonary:  Bilateral breath sounds intact, clear with rales rhonchi or wheeze  Abdomen: soft non tender, no pulsatile masses noted, no rebound rigidity or guarding noted  Skin: intact warm no abnormal turgor  Psychiatric: alert without any obvious cognitive issues, oriented to person, place, and time    Pretibial venous prominence  Right popliteal vein with mid right lateral thigh reticular vein noted    Assessment/Plan   Symptomatic veins of bilateral extremity  Continue compression elevation activity  VI study ordered and pending           Timmy Alas DO 01/14/25 10:16 AM

## 2025-01-28 ENCOUNTER — APPOINTMENT (OUTPATIENT)
Dept: OBSTETRICS AND GYNECOLOGY | Facility: CLINIC | Age: 40
End: 2025-01-28
Payer: COMMERCIAL

## 2025-02-05 ENCOUNTER — APPOINTMENT (OUTPATIENT)
Dept: VASCULAR MEDICINE | Facility: HOSPITAL | Age: 40
End: 2025-02-05
Payer: COMMERCIAL

## 2025-02-27 ENCOUNTER — APPOINTMENT (OUTPATIENT)
Dept: OBSTETRICS AND GYNECOLOGY | Facility: CLINIC | Age: 40
End: 2025-02-27
Payer: COMMERCIAL

## 2025-03-25 ENCOUNTER — APPOINTMENT (OUTPATIENT)
Dept: ENDOCRINOLOGY | Facility: CLINIC | Age: 40
End: 2025-03-25
Payer: COMMERCIAL

## 2025-03-28 ENCOUNTER — TELEPHONE (OUTPATIENT)
Dept: OBSTETRICS AND GYNECOLOGY | Facility: CLINIC | Age: 40
End: 2025-03-28
Payer: COMMERCIAL

## 2025-03-31 ENCOUNTER — APPOINTMENT (OUTPATIENT)
Dept: OBSTETRICS AND GYNECOLOGY | Facility: CLINIC | Age: 40
End: 2025-03-31
Payer: COMMERCIAL

## 2025-03-31 VITALS
SYSTOLIC BLOOD PRESSURE: 119 MMHG | BODY MASS INDEX: 25.16 KG/M2 | WEIGHT: 166 LBS | DIASTOLIC BLOOD PRESSURE: 69 MMHG | HEIGHT: 68 IN

## 2025-03-31 DIAGNOSIS — N88.2 CERVICAL STENOSIS (UTERINE CERVIX): ICD-10-CM

## 2025-03-31 DIAGNOSIS — Z12.31 VISIT FOR SCREENING MAMMOGRAM: ICD-10-CM

## 2025-03-31 DIAGNOSIS — Z01.419 WELL WOMAN EXAM WITH ROUTINE GYNECOLOGICAL EXAM: Primary | ICD-10-CM

## 2025-03-31 DIAGNOSIS — Z30.09 BIRTH CONTROL COUNSELING: ICD-10-CM

## 2025-03-31 PROCEDURE — 87624 HPV HI-RISK TYP POOLED RSLT: CPT

## 2025-03-31 PROCEDURE — 87491 CHLMYD TRACH DNA AMP PROBE: CPT

## 2025-03-31 PROCEDURE — 88175 CYTOPATH C/V AUTO FLUID REDO: CPT

## 2025-03-31 PROCEDURE — 3008F BODY MASS INDEX DOCD: CPT | Performed by: OBSTETRICS & GYNECOLOGY

## 2025-03-31 PROCEDURE — 1036F TOBACCO NON-USER: CPT | Performed by: OBSTETRICS & GYNECOLOGY

## 2025-03-31 PROCEDURE — 87591 N.GONORRHOEAE DNA AMP PROB: CPT

## 2025-03-31 PROCEDURE — 99396 PREV VISIT EST AGE 40-64: CPT | Performed by: OBSTETRICS & GYNECOLOGY

## 2025-03-31 RX ORDER — IBUPROFEN 600 MG/1
TABLET ORAL
Qty: 2 TABLET | Refills: 0 | Status: SHIPPED | OUTPATIENT
Start: 2025-03-31 | End: 2025-04-03 | Stop reason: WASHOUT

## 2025-03-31 RX ORDER — CLOBETASOL PROPIONATE 0.5 MG/G
CREAM TOPICAL
COMMUNITY
Start: 2024-11-23 | End: 2025-04-03 | Stop reason: WASHOUT

## 2025-03-31 RX ORDER — SULFACETAMIDE SODIUM, SULFUR 100; 50 MG/G; MG/G
EMULSION TOPICAL
COMMUNITY
Start: 2024-09-08

## 2025-03-31 RX ORDER — MISOPROSTOL 200 UG/1
TABLET ORAL
Qty: 2 TABLET | Refills: 0 | Status: SHIPPED | OUTPATIENT
Start: 2025-03-31 | End: 2025-04-03 | Stop reason: WASHOUT

## 2025-03-31 SDOH — ECONOMIC STABILITY: TRANSPORTATION INSECURITY
IN THE PAST 12 MONTHS, HAS LACK OF TRANSPORTATION KEPT YOU FROM MEETINGS, WORK, OR FROM GETTING THINGS NEEDED FOR DAILY LIVING?: NO

## 2025-03-31 SDOH — ECONOMIC STABILITY: FOOD INSECURITY: WITHIN THE PAST 12 MONTHS, YOU WORRIED THAT YOUR FOOD WOULD RUN OUT BEFORE YOU GOT MONEY TO BUY MORE.: NEVER TRUE

## 2025-03-31 SDOH — ECONOMIC STABILITY: FOOD INSECURITY: WITHIN THE PAST 12 MONTHS, THE FOOD YOU BOUGHT JUST DIDN'T LAST AND YOU DIDN'T HAVE MONEY TO GET MORE.: NEVER TRUE

## 2025-03-31 SDOH — ECONOMIC STABILITY: INCOME INSECURITY: IN THE LAST 12 MONTHS, WAS THERE A TIME WHEN YOU WERE NOT ABLE TO PAY THE MORTGAGE OR RENT ON TIME?: NO

## 2025-03-31 SDOH — ECONOMIC STABILITY: TRANSPORTATION INSECURITY
IN THE PAST 12 MONTHS, HAS THE LACK OF TRANSPORTATION KEPT YOU FROM MEDICAL APPOINTMENTS OR FROM GETTING MEDICATIONS?: NO

## 2025-03-31 ASSESSMENT — SOCIAL DETERMINANTS OF HEALTH (SDOH)
WITHIN THE LAST YEAR, HAVE YOU BEEN AFRAID OF YOUR PARTNER OR EX-PARTNER?: NO
WITHIN THE LAST YEAR, HAVE YOU BEEN HUMILIATED OR EMOTIONALLY ABUSED IN OTHER WAYS BY YOUR PARTNER OR EX-PARTNER?: NO
WITHIN THE LAST YEAR, HAVE TO BEEN RAPED OR FORCED TO HAVE ANY KIND OF SEXUAL ACTIVITY BY YOUR PARTNER OR EX-PARTNER?: NO
HOW HARD IS IT FOR YOU TO PAY FOR THE VERY BASICS LIKE FOOD, HOUSING, MEDICAL CARE, AND HEATING?: NOT HARD AT ALL
WITHIN THE LAST YEAR, HAVE YOU BEEN KICKED, HIT, SLAPPED, OR OTHERWISE PHYSICALLY HURT BY YOUR PARTNER OR EX-PARTNER?: NO

## 2025-03-31 ASSESSMENT — LIFESTYLE VARIABLES
SKIP TO QUESTIONS 9-10: 1
HOW OFTEN DO YOU HAVE A DRINK CONTAINING ALCOHOL: 2-4 TIMES A MONTH
AUDIT-C TOTAL SCORE: 2
HOW MANY STANDARD DRINKS CONTAINING ALCOHOL DO YOU HAVE ON A TYPICAL DAY: 1 OR 2
HOW OFTEN DO YOU HAVE SIX OR MORE DRINKS ON ONE OCCASION: NEVER

## 2025-03-31 ASSESSMENT — ENCOUNTER SYMPTOMS
OCCASIONAL FEELINGS OF UNSTEADINESS: 0
LOSS OF SENSATION IN FEET: 0
DEPRESSION: 0

## 2025-03-31 ASSESSMENT — PATIENT HEALTH QUESTIONNAIRE - PHQ9
1. LITTLE INTEREST OR PLEASURE IN DOING THINGS: NOT AT ALL
SUM OF ALL RESPONSES TO PHQ9 QUESTIONS 1 AND 2: 0
2. FEELING DOWN, DEPRESSED OR HOPELESS: NOT AT ALL

## 2025-03-31 NOTE — PROGRESS NOTES
"Amparo Perez is a 40 y.o. female who is here for a annual exam.     Periods are regular every 28-30 days, lasting 5-7  days.       Sexually active: Is interested in birth control.  Options reviewed with patient.  Would like an IUD.    Regular self breast exam: no  Breast exams encouraged    Menstrual History:  OB History          3    Para   1    Term   1            AB   2    Living             SAB   2    IAB        Ectopic        Multiple        Live Births                    Patient's last menstrual period was 2025.         Review of Systems  All Normal Review of Systems  Constitutional: no fever, no chills, no recent weight gain, no recent weight loss and no fatigue.    Gastrointestinal: no abdominal pain, no constipation, no nausea, no diarrhea and no vomiting.    Genitourinary: no dysuria, no urinary incontinence, no vaginal dryness, no vaginal itching, no dyspareunia, no pelvic pain, no dysmenorrhea, no sexual problems, no change in urinary frequency, no vaginal discharge, no unexplained vaginal bleeding and no lesion/sore.    Breasts: No masses.  No nipple discharge.  No redness    Objective   /69   Ht 1.727 m (5' 8\")   Wt 75.3 kg (166 lb)   LMP 2025   BMI 25.24 kg/m²     OBGyn Exam   Physical Exam  Constitutional: Alert and in no acute distress. Well developed, well nourished.   Head and Face: Head and face: Normal.    Eyes: Normal external exam - nonicteric sclera, extraocular movements intact (EOMI) and no ptosis.   Ears, Nose, Mouth, and Throat: External inspection of ears and nose: Normal.    Neck: No neck asymmetry. Supple. Thyroid not enlarged and there were no palpable thyroid nodules.   Chest: Breasts: Normal appearance, no nipple discharge and no skin changes. Palpation of breasts and axillae: No palpable mass and no axillary lymphadenopathy.   Abdomen: Soft nontender; no abdominal mass palpated. No organomegaly. No hernias.     Genitourinary:   External " genitalia: Normal. No inguinal lymphadenopathy. Bartholin's Urethral and Skenes Glands: Normal. Urethra: Normal.    Bladder: Normal on palpation.   Vagina: Normal. No discharge  Cervix: Normal.    Uterus: Normal.    Right Adnexa/parametria: Normal.  Left Adnexa/parametria: Normal.    Inspection of Perianal Area: Normal.     Musculoskeletal: No joint swelling seen, normal movements of all extremities.   Skin: Normal skin color and pigmentation, normal skin turgor, and no rash.   Neurologic: Non-focal. Grossly intact.   Psychiatric: Alert and oriented x 3. Affect normal to patient baseline. Mood: Appropriate.      Assessment/Plan   1) Annual exam:  Pap with HPV testing completed today  Mammogram order placed    2) Birth control counseling  Birth control options reviewed with patient.  Pill ring Depo-Provera Implanon and IUD.  Would like an IUD.  Side effects and risks reviewed: Bleeding cramping following up perforation STDs pregnancy and ectopic.  We will insert your IUD on your menstrual cycle.  Please take 2 misoprostol with 600 mg of Motrin the night prior to the procedure.  The day of the procedure take another 600 mg of Motrin.  Please always take Motrin with food.  Will insert your IUD on your cycle.  We added gonorrhea and Chlamydia testing to your Pap test today    Thank you again for your visit to our office today  Please follow-up as needed or in 1 year with your annual exam

## 2025-04-01 LAB
C TRACH RRNA SPEC QL NAA+PROBE: NEGATIVE
N GONORRHOEA DNA SPEC QL PROBE+SIG AMP: NEGATIVE

## 2025-04-03 ENCOUNTER — APPOINTMENT (OUTPATIENT)
Dept: PRIMARY CARE | Facility: CLINIC | Age: 40
End: 2025-04-03
Payer: COMMERCIAL

## 2025-04-03 VITALS
SYSTOLIC BLOOD PRESSURE: 118 MMHG | BODY MASS INDEX: 25.76 KG/M2 | WEIGHT: 170 LBS | TEMPERATURE: 97.9 F | HEIGHT: 68 IN | DIASTOLIC BLOOD PRESSURE: 72 MMHG

## 2025-04-03 DIAGNOSIS — Z00.00 HEALTH CARE MAINTENANCE: Primary | ICD-10-CM

## 2025-04-03 DIAGNOSIS — Z13.6 SCREENING, ISCHEMIC HEART DISEASE: ICD-10-CM

## 2025-04-03 DIAGNOSIS — Z13.228 SCREENING FOR METABOLIC DISORDER: ICD-10-CM

## 2025-04-03 DIAGNOSIS — Z01.84 IMMUNITY TO MEASLES DETERMINED BY SEROLOGIC TEST: ICD-10-CM

## 2025-04-03 DIAGNOSIS — Z13.29 SCREENING FOR THYROID DISORDER: ICD-10-CM

## 2025-04-03 DIAGNOSIS — E55.9 VITAMIN D DEFICIENCY: ICD-10-CM

## 2025-04-03 PROBLEM — O03.9 PREGNANCY WITH ABORTIVE OUTCOME: Status: ACTIVE | Noted: 2025-04-03

## 2025-04-03 PROCEDURE — 99396 PREV VISIT EST AGE 40-64: CPT | Performed by: NURSE PRACTITIONER

## 2025-04-03 PROCEDURE — 3008F BODY MASS INDEX DOCD: CPT | Performed by: NURSE PRACTITIONER

## 2025-04-03 ASSESSMENT — ENCOUNTER SYMPTOMS
ABDOMINAL PAIN: 0
PALPITATIONS: 0
ENDOCRINE NEGATIVE: 1
CARDIOVASCULAR NEGATIVE: 1
PSYCHIATRIC NEGATIVE: 1
COUGH: 0
ACTIVITY CHANGE: 0
UNEXPECTED WEIGHT CHANGE: 0
MUSCULOSKELETAL NEGATIVE: 1
CONSTIPATION: 0
RESPIRATORY NEGATIVE: 1
SHORTNESS OF BREATH: 0
GASTROINTESTINAL NEGATIVE: 1
NEUROLOGICAL NEGATIVE: 1

## 2025-04-03 ASSESSMENT — PATIENT HEALTH QUESTIONNAIRE - PHQ9
2. FEELING DOWN, DEPRESSED OR HOPELESS: NOT AT ALL
SUM OF ALL RESPONSES TO PHQ9 QUESTIONS 1 AND 2: 0
1. LITTLE INTEREST OR PLEASURE IN DOING THINGS: NOT AT ALL

## 2025-04-03 NOTE — ASSESSMENT & PLAN NOTE
IgG ordered, will follow up for need of booster if indicated.    Orders:    Measles (Rubeola) Antibody, IgG; Future

## 2025-04-03 NOTE — ASSESSMENT & PLAN NOTE
Continue with healthy diet and lifestyle.  Advised to make Optometry/Ophthalmology appointment when able.

## 2025-04-03 NOTE — PATIENT INSTRUCTIONS
Great seeing you today!  Labs are in - follow up dependent on results.  Mention sharp pains on right side of head to dentist on your next visit.

## 2025-04-03 NOTE — PROGRESS NOTES
"Subjective   Patient ID: Amparo Perez is a 40 y.o. female who presents for Annual Exam (CPX).    HPI   DERM  Castaneda twice per year   RIGHT parietal sharp pain.  ~\"One second sharp pain\"  has happened 3 times not sure why    OPHTH - due to go  DENTAL - current (Jan)  GYN - Varyani - will have IUD in a month  MANISHA - Imeldakathleen  Has been doing Weight Watchers > 1 year - happy with results.  Did see Dr. Cruz about thyroid but advised no need for surgery.  Would like labs checked.  To See Dr. Nuñez  7/25  Works as RN  wonders about Measles    Review of Systems   Constitutional:  Negative for activity change and unexpected weight change.   HENT: Negative.     Respiratory: Negative.  Negative for cough and shortness of breath.    Cardiovascular: Negative.  Negative for chest pain and palpitations.   Gastrointestinal: Negative.  Negative for abdominal pain and constipation.   Endocrine: Negative.    Genitourinary: Negative.    Musculoskeletal: Negative.    Skin: Negative.    Neurological: Negative.    Psychiatric/Behavioral: Negative.     All other systems reviewed and are negative.      Objective   /72   Temp 36.6 °C (97.9 °F)   Ht 1.727 m (5' 8\")   Wt 77.1 kg (170 lb)   LMP 03/27/2025   BMI 25.85 kg/m²     Physical Exam  Vitals and nursing note reviewed.   Constitutional:       General: She is not in acute distress.     Appearance: Normal appearance. She is not ill-appearing.   HENT:      Head: Normocephalic and atraumatic.      Comments: R TMJ Crepitus on movement     Right Ear: Tympanic membrane, ear canal and external ear normal.      Left Ear: Ear canal and external ear normal. No tenderness. A middle ear effusion is present. Tympanic membrane is bulging. Tympanic membrane is not injected or erythematous.      Mouth/Throat:      Mouth: Mucous membranes are moist.      Pharynx: Oropharynx is clear. Uvula midline.   Eyes:      General: Lids are normal.   Neck:      Thyroid: Thyromegaly present. No thyroid " tenderness.   Cardiovascular:      Rate and Rhythm: Normal rate and regular rhythm.      Pulses: Normal pulses.      Heart sounds: Normal heart sounds.   Pulmonary:      Effort: Pulmonary effort is normal.      Breath sounds: Normal breath sounds and air entry.   Abdominal:      General: Abdomen is flat. Bowel sounds are normal. There is no distension.      Palpations: Abdomen is soft. There is no mass.      Tenderness: There is no abdominal tenderness.   Musculoskeletal:      Cervical back: Normal range of motion and neck supple. No tenderness.      Right lower leg: No edema.      Left lower leg: No edema.   Skin:     General: Skin is warm and dry.      Capillary Refill: Capillary refill takes less than 2 seconds.   Neurological:      General: No focal deficit present.      Mental Status: She is alert and oriented to person, place, and time.      Deep Tendon Reflexes: Reflexes are normal and symmetric.   Psychiatric:         Attention and Perception: Attention and perception normal.         Mood and Affect: Mood normal.         Speech: Speech normal.         Behavior: Behavior normal. Behavior is cooperative.         Thought Content: Thought content normal.         Cognition and Memory: Cognition and memory normal.         Judgment: Judgment normal.         Assessment/Plan   Assessment & Plan  Health care maintenance  Continue with healthy diet and lifestyle.  Advised to make Optometry/Ophthalmology appointment when able.         Screening for metabolic disorder  Labs ordered.    Orders:    CBC; Future    Screening for thyroid disorder  Labs ordered.    Orders:    TSH with reflex to Free T4 if abnormal; Future    Vitamin D deficiency  Labs ordered.    Orders:    Vitamin D 25-Hydroxy,Total (for eval of Vitamin D levels); Future    Screening, ischemic heart disease  Labs ordered.    Orders:    Comprehensive Metabolic Panel; Future    Lipid Panel; Future    Immunity to measles determined by serologic test  IgG  ordered, will follow up for need of booster if indicated.    Orders:    Measles (Rubeola) Antibody, IgG; Future

## 2025-04-04 LAB
25(OH)D3+25(OH)D2 SERPL-MCNC: 60 NG/ML (ref 30–100)
ALBUMIN SERPL-MCNC: 4.8 G/DL (ref 3.6–5.1)
ALP SERPL-CCNC: 70 U/L (ref 31–125)
ALT SERPL-CCNC: 13 U/L (ref 6–29)
ANION GAP SERPL CALCULATED.4IONS-SCNC: 9 MMOL/L (CALC) (ref 7–17)
AST SERPL-CCNC: 14 U/L (ref 10–30)
BILIRUB SERPL-MCNC: 0.7 MG/DL (ref 0.2–1.2)
BUN SERPL-MCNC: 22 MG/DL (ref 7–25)
CALCIUM SERPL-MCNC: 9.6 MG/DL (ref 8.6–10.2)
CHLORIDE SERPL-SCNC: 103 MMOL/L (ref 98–110)
CHOLEST SERPL-MCNC: 232 MG/DL
CHOLEST/HDLC SERPL: 3.1 (CALC)
CO2 SERPL-SCNC: 27 MMOL/L (ref 20–32)
CREAT SERPL-MCNC: 0.69 MG/DL (ref 0.5–0.99)
EGFRCR SERPLBLD CKD-EPI 2021: 112 ML/MIN/1.73M2
ERYTHROCYTE [DISTWIDTH] IN BLOOD BY AUTOMATED COUNT: 13.1 % (ref 11–15)
GLUCOSE SERPL-MCNC: 95 MG/DL (ref 65–99)
HCT VFR BLD AUTO: 43.4 % (ref 35–45)
HDLC SERPL-MCNC: 75 MG/DL
HGB BLD-MCNC: 14.4 G/DL (ref 11.7–15.5)
LDLC SERPL CALC-MCNC: 141 MG/DL (CALC)
MCH RBC QN AUTO: 30.4 PG (ref 27–33)
MCHC RBC AUTO-ENTMCNC: 33.2 G/DL (ref 32–36)
MCV RBC AUTO: 91.6 FL (ref 80–100)
MEV IGG SER IA-ACNC: >300 AU/ML
NONHDLC SERPL-MCNC: 157 MG/DL (CALC)
PLATELET # BLD AUTO: 269 THOUSAND/UL (ref 140–400)
PMV BLD REES-ECKER: 12.6 FL (ref 7.5–12.5)
POTASSIUM SERPL-SCNC: 4.3 MMOL/L (ref 3.5–5.3)
PROT SERPL-MCNC: 7.7 G/DL (ref 6.1–8.1)
RBC # BLD AUTO: 4.74 MILLION/UL (ref 3.8–5.1)
SODIUM SERPL-SCNC: 139 MMOL/L (ref 135–146)
T4 FREE SERPL-MCNC: 1.4 NG/DL (ref 0.8–1.8)
TRIGL SERPL-MCNC: 64 MG/DL
TSH SERPL-ACNC: 0.36 MIU/L
WBC # BLD AUTO: 5.5 THOUSAND/UL (ref 3.8–10.8)

## 2025-04-15 ENCOUNTER — TELEPHONE (OUTPATIENT)
Dept: OBSTETRICS AND GYNECOLOGY | Facility: CLINIC | Age: 40
End: 2025-04-15
Payer: COMMERCIAL

## 2025-04-15 DIAGNOSIS — N88.2 CERVICAL STENOSIS (UTERINE CERVIX): Primary | ICD-10-CM

## 2025-04-15 LAB
CYTOLOGY CMNT CVX/VAG CYTO-IMP: NORMAL
HPV HR 12 DNA GENITAL QL NAA+PROBE: NEGATIVE
HPV HR GENOTYPES PNL CVX NAA+PROBE: NEGATIVE
HPV16 DNA SPEC QL NAA+PROBE: NEGATIVE
HPV18 DNA SPEC QL NAA+PROBE: NEGATIVE
LAB AP HPV GENOTYPE QUESTION: YES
LAB AP HPV HR: NORMAL
LAB AP PAP ADDITIONAL TESTS: NORMAL
LABORATORY COMMENT REPORT: NORMAL
LMP START DATE: NORMAL
PATH REPORT.TOTAL CANCER: NORMAL

## 2025-04-15 RX ORDER — MISOPROSTOL 200 UG/1
TABLET ORAL
Qty: 2 TABLET | Refills: 0 | Status: SHIPPED | OUTPATIENT
Start: 2025-04-15

## 2025-04-15 RX ORDER — IBUPROFEN 600 MG/1
TABLET ORAL
Qty: 2 TABLET | Refills: 0 | Status: SHIPPED | OUTPATIENT
Start: 2025-04-15

## 2025-04-15 NOTE — TELEPHONE ENCOUNTER
Patient called in requesting RX be resent due to Primary Physician mistakenly cancelled both RX scripts for her IUD prep.Patient stated she mistakenly told Physician she was currently not taking any meds.Patient stated both was cancelled.    Last office visit:3/31/25  Next office visit:4/28/25  Med:  Pharmacy:[id number] - [place], [place] - [address]  [phone], [place] [address]  Phone: [phone]       Monique Maria CNA

## 2025-04-28 ENCOUNTER — APPOINTMENT (OUTPATIENT)
Dept: OBSTETRICS AND GYNECOLOGY | Facility: CLINIC | Age: 40
End: 2025-04-28
Payer: COMMERCIAL

## 2025-04-28 VITALS
DIASTOLIC BLOOD PRESSURE: 70 MMHG | WEIGHT: 170 LBS | SYSTOLIC BLOOD PRESSURE: 110 MMHG | HEIGHT: 68 IN | BODY MASS INDEX: 25.76 KG/M2

## 2025-04-28 DIAGNOSIS — Z32.02 PREGNANCY TEST NEGATIVE: ICD-10-CM

## 2025-04-28 DIAGNOSIS — Z11.3 SCREEN FOR STD (SEXUALLY TRANSMITTED DISEASE): ICD-10-CM

## 2025-04-28 DIAGNOSIS — Z30.430 ENCOUNTER FOR IUD INSERTION: Primary | ICD-10-CM

## 2025-04-28 LAB — PREGNANCY TEST URINE, POC: NEGATIVE

## 2025-04-28 PROCEDURE — 58300 INSERT INTRAUTERINE DEVICE: CPT | Performed by: OBSTETRICS & GYNECOLOGY

## 2025-04-28 PROCEDURE — 76817 TRANSVAGINAL US OBSTETRIC: CPT | Performed by: OBSTETRICS & GYNECOLOGY

## 2025-04-28 PROCEDURE — 81025 URINE PREGNANCY TEST: CPT | Performed by: OBSTETRICS & GYNECOLOGY

## 2025-04-28 NOTE — PROGRESS NOTES
Patient presents today for an IUD insertion  Negative pregnancy test  Questions answered and consent signed  Gonorrhea and Chlamydia testing was negative    Patient ID: Amparo Perez is a 40 y.o. female.    IUD Management    Performed by: Nyla Mccoy MD  Authorized by: Nyla Mccoy MD    Procedure: IUD insertion    Consent obtained by patient, parent, or legal power of  - including discussion of procedure risks and benefits, patient questions answered, and patient education provided: yes    Pregnancy risk comment:  Neg preg test  Date/Time of Insertion:  4/28/2025 2:21 PM  Speculum placed in vagina: yes    Cervix cleaned and prepped: yes    Tenaculum/Allis/Ring Forceps applied to cervix: no    Anesthesia used: no    Uterus sound depth (cm):  7  Cervix manually dilated: no    IUD inserted without complications: yes    OSM: levonorgestrel 20 mcg/24hr  Patient tolerated procedure well: yes    Inserted with ultrasound guidance: yes    Transvaginal sono confirmed fundal placement: yes    Intended removal date: 8 years        Thank you for your visit.  Today we inserted your Mirena IUD without any complications.  I am glad you tolerated the procedure well.  We also completed an ultrasound which shows your IUD is in the proper location.  Will follow-up in 4 to 6 weeks for an IUD check.  Your new IUD is good through April 2033.  Will check this yearly also at your annual exam    Thank you again for your visit.  I am glad you tolerated the procedure well

## 2025-04-30 ENCOUNTER — HOSPITAL ENCOUNTER (OUTPATIENT)
Dept: RADIOLOGY | Facility: CLINIC | Age: 40
Discharge: HOME | End: 2025-04-30
Payer: COMMERCIAL

## 2025-04-30 VITALS — BODY MASS INDEX: 25.76 KG/M2 | WEIGHT: 169.97 LBS | HEIGHT: 68 IN

## 2025-04-30 DIAGNOSIS — Z12.31 VISIT FOR SCREENING MAMMOGRAM: ICD-10-CM

## 2025-04-30 PROCEDURE — 77063 BREAST TOMOSYNTHESIS BI: CPT | Performed by: RADIOLOGY

## 2025-04-30 PROCEDURE — 77067 SCR MAMMO BI INCL CAD: CPT

## 2025-04-30 PROCEDURE — 77067 SCR MAMMO BI INCL CAD: CPT | Performed by: RADIOLOGY

## 2025-05-13 ENCOUNTER — APPOINTMENT (OUTPATIENT)
Dept: OPHTHALMOLOGY | Age: 40
End: 2025-05-13
Payer: COMMERCIAL

## 2025-06-09 ENCOUNTER — APPOINTMENT (OUTPATIENT)
Dept: OBSTETRICS AND GYNECOLOGY | Facility: CLINIC | Age: 40
End: 2025-06-09
Payer: COMMERCIAL

## 2025-06-13 ENCOUNTER — APPOINTMENT (OUTPATIENT)
Dept: OBSTETRICS AND GYNECOLOGY | Facility: CLINIC | Age: 40
End: 2025-06-13
Payer: COMMERCIAL

## 2025-06-23 ENCOUNTER — APPOINTMENT (OUTPATIENT)
Dept: OBSTETRICS AND GYNECOLOGY | Facility: CLINIC | Age: 40
End: 2025-06-23
Payer: COMMERCIAL

## 2025-06-23 VITALS
BODY MASS INDEX: 26.53 KG/M2 | WEIGHT: 169 LBS | HEIGHT: 67 IN | DIASTOLIC BLOOD PRESSURE: 70 MMHG | SYSTOLIC BLOOD PRESSURE: 130 MMHG

## 2025-06-23 DIAGNOSIS — Z30.431 SURVEILLANCE FOR BIRTH CONTROL, INTRAUTERINE DEVICE: Primary | ICD-10-CM

## 2025-06-23 PROCEDURE — 3008F BODY MASS INDEX DOCD: CPT | Performed by: OBSTETRICS & GYNECOLOGY

## 2025-06-23 PROCEDURE — 99213 OFFICE O/P EST LOW 20 MIN: CPT | Performed by: OBSTETRICS & GYNECOLOGY

## 2025-06-23 PROCEDURE — 1036F TOBACCO NON-USER: CPT | Performed by: OBSTETRICS & GYNECOLOGY

## 2025-06-23 NOTE — PROGRESS NOTES
"Subjective   Patient ID: Amparo Perez is a 40 y.o. female who presents for IUD check .    Mirena IUD inserted in April.  Patient states she had spotting.  In June she had a cycle and no bleeding since  Patient reassured      ROS  All Normal Review of Systems  Constitutional: no fever, no chills, no recent weight gain, no recent weight loss and no fatigue.    Gastrointestinal: no abdominal pain, no constipation, no nausea, no diarrhea and no vomiting.    Genitourinary: no dysuria, no urinary incontinence, no vaginal dryness, no vaginal itching, no dyspareunia, no pelvic pain, no dysmenorrhea, no sexual problems, no change in urinary frequency, no vaginal discharge, no unexplained vaginal bleeding and no lesion/sore.    Breasts: No masses.  No nipple discharge.  No redness    Objective   /70   Ht 1.702 m (5' 7\")   Wt 76.7 kg (169 lb)   LMP 06/17/2025   BMI 26.47 kg/m²    Physical Exam  General: No distress.  Alert and oriented  Abdomen: Soft, nontender, nondistended  External Genitalia:  no masses.  no erythema.  no discharge noted.  Vagina:  no masses.      Cervix: no masses.  IUD strings palpated  Uterus:  normal size and contour.  no masses. palpated  Adnexa: R: no masses, non tender.    Adnexa: L: no masses.  non tender  Extremities: No swelling  Psych: No sadness.      Assessment/Plan   1) Surveillance of IUD  Mirena IUD inserted April 2025.  Is good through April 2033.  Assessed on exam today  Will follow yearly with her annual exams          Thank you for your visit to our office today.     "

## 2025-07-29 ENCOUNTER — APPOINTMENT (OUTPATIENT)
Dept: ENDOCRINOLOGY | Facility: CLINIC | Age: 40
End: 2025-07-29
Payer: COMMERCIAL

## 2025-09-02 ENCOUNTER — APPOINTMENT (OUTPATIENT)
Dept: ENDOCRINOLOGY | Facility: CLINIC | Age: 40
End: 2025-09-02
Payer: COMMERCIAL

## 2026-01-23 ENCOUNTER — APPOINTMENT (OUTPATIENT)
Facility: CLINIC | Age: 41
End: 2026-01-23
Payer: COMMERCIAL

## 2026-04-06 ENCOUNTER — APPOINTMENT (OUTPATIENT)
Dept: OBSTETRICS AND GYNECOLOGY | Facility: CLINIC | Age: 41
End: 2026-04-06
Payer: COMMERCIAL